# Patient Record
Sex: MALE | Race: OTHER | HISPANIC OR LATINO | URBAN - METROPOLITAN AREA
[De-identification: names, ages, dates, MRNs, and addresses within clinical notes are randomized per-mention and may not be internally consistent; named-entity substitution may affect disease eponyms.]

---

## 2018-11-10 ENCOUNTER — EMERGENCY (EMERGENCY)
Facility: HOSPITAL | Age: 48
LOS: 1 days | Discharge: HOME | End: 2018-11-10
Attending: EMERGENCY MEDICINE
Payer: MEDICAID

## 2018-11-10 VITALS
SYSTOLIC BLOOD PRESSURE: 136 MMHG | RESPIRATION RATE: 17 BRPM | TEMPERATURE: 98 F | OXYGEN SATURATION: 96 % | WEIGHT: 160.06 LBS | HEIGHT: 68 IN | HEART RATE: 99 BPM | DIASTOLIC BLOOD PRESSURE: 82 MMHG

## 2018-11-10 PROCEDURE — 74176 CT ABD & PELVIS W/O CONTRAST: CPT | Mod: 26

## 2018-11-10 PROCEDURE — 99284 EMERGENCY DEPT VISIT MOD MDM: CPT | Mod: 25

## 2018-11-10 RX ORDER — ACETAMINOPHEN 500 MG
975 TABLET ORAL ONCE
Qty: 0 | Refills: 0 | Status: COMPLETED | OUTPATIENT
Start: 2018-11-10 | End: 2018-11-10

## 2018-11-10 RX ADMIN — Medication 975 MILLIGRAM(S): at 23:39

## 2018-11-10 RX ADMIN — Medication 100 MILLIGRAM(S): at 23:39

## 2018-11-10 NOTE — ED ADULT NURSE NOTE - OBJECTIVE STATEMENT
Pt is a 48YOM no sig medical history received ambulatory A&Ox4 complaining of abscess. Pt states that he had an abscess to his R lower abdomen starting on Monday. Daughter states that he "popped" this on Thursday and also noted another abscess to his R gluteal fold. Pt states that his daughter "popped" that abscess with white puss coming out. Pt notes pain and increased redness to both sites, worse than before. Pt denies any fever or chills. Pts pain radiating to scrotal sac. No swelling noted to testicles.

## 2018-11-10 NOTE — ED PROVIDER NOTE - OBJECTIVE STATEMENT
47 yo male with pmhx of psoriasis presents with abscess in the right medial gluteal fold. Pt states he noticed a pimple like structure 9 days ago that became larger with a fluctuant center surrounded by erythema. 5 days ago, pt had his daughter pop it with a disinfected needle and expressed pus. In the ED, an 5x5cm area of induration, central healing scab, and no area of fluctuance is noted. Pt also has similar area of induration and healing scab on his RLQ. Pt denies fever, chills, testicular and penile pain, urinary symptoms, discharge, pain or difficulty moving his bowels. Mild erythema on the right scrotal skin is noted, but patient states it appears baseline.

## 2018-11-10 NOTE — ED PROVIDER NOTE - MEDICAL DECISION MAKING DETAILS
47 yo male with hx of psoriasis presents with gluteal fold abscess. DDx: skin abscess, perirectal/ perianal abscess (not likely given location), vasile's gangrene (not likely given no pain on exam). IV abx in CDU to ensure not vasile's. D/C home on oral abx if no progression to Vasile's Ghulam: 48 year old male with right buttock abscess (not near anus) that was drained at home now with surrounding erythema and swelling upto medial thigh. no tenderness to scrotum but mild erythema , no crepitus, no testicle involvement. no fevers. will get labs, ct, iv abx given extent of cellulitis, likely cdu for further management.

## 2018-11-10 NOTE — ED PROVIDER NOTE - NS ED ROS FT
GENERAL: No fever, chills  HEENT: no trouble swallowing or speaking   CARDIAC: no chest pain/pressure, SOB, lower ex edema  PULMONARY: no cough, SOB  GI: no abdominal pain, n/v/d/c.   : no dysuria, frequency, hematuria  SKIN: + area of induration with central healing scab, no vesicles  NEURO: no headache, paraesthesias.   MSK: No joint pain, myalgia, weakness.

## 2018-11-11 VITALS
TEMPERATURE: 98 F | HEART RATE: 78 BPM | DIASTOLIC BLOOD PRESSURE: 61 MMHG | SYSTOLIC BLOOD PRESSURE: 110 MMHG | OXYGEN SATURATION: 98 % | RESPIRATION RATE: 18 BRPM

## 2018-11-11 LAB
ALBUMIN SERPL ELPH-MCNC: 4.2 G/DL — SIGNIFICANT CHANGE UP (ref 3.3–5)
ALP SERPL-CCNC: 95 U/L — SIGNIFICANT CHANGE UP (ref 40–120)
ALT FLD-CCNC: 38 U/L — SIGNIFICANT CHANGE UP (ref 10–45)
ANION GAP SERPL CALC-SCNC: 13 MMOL/L — SIGNIFICANT CHANGE UP (ref 5–17)
APPEARANCE UR: CLEAR — SIGNIFICANT CHANGE UP
AST SERPL-CCNC: 34 U/L — SIGNIFICANT CHANGE UP (ref 10–40)
BACTERIA # UR AUTO: NEGATIVE — SIGNIFICANT CHANGE UP
BASE EXCESS BLDV CALC-SCNC: -0.1 MMOL/L — SIGNIFICANT CHANGE UP (ref -2–2)
BASOPHILS # BLD AUTO: 0 K/UL — SIGNIFICANT CHANGE UP (ref 0–0.2)
BASOPHILS NFR BLD AUTO: 0.4 % — SIGNIFICANT CHANGE UP (ref 0–2)
BILIRUB SERPL-MCNC: 0.3 MG/DL — SIGNIFICANT CHANGE UP (ref 0.2–1.2)
BILIRUB UR-MCNC: NEGATIVE — SIGNIFICANT CHANGE UP
BUN SERPL-MCNC: 22 MG/DL — SIGNIFICANT CHANGE UP (ref 7–23)
CA-I SERPL-SCNC: 1.16 MMOL/L — SIGNIFICANT CHANGE UP (ref 1.12–1.3)
CALCIUM SERPL-MCNC: 9.1 MG/DL — SIGNIFICANT CHANGE UP (ref 8.4–10.5)
CHLORIDE BLDV-SCNC: 107 MMOL/L — SIGNIFICANT CHANGE UP (ref 96–108)
CHLORIDE SERPL-SCNC: 105 MMOL/L — SIGNIFICANT CHANGE UP (ref 96–108)
CO2 BLDV-SCNC: 26 MMOL/L — SIGNIFICANT CHANGE UP (ref 22–30)
CO2 SERPL-SCNC: 23 MMOL/L — SIGNIFICANT CHANGE UP (ref 22–31)
COLOR SPEC: YELLOW — SIGNIFICANT CHANGE UP
CREAT SERPL-MCNC: 1.06 MG/DL — SIGNIFICANT CHANGE UP (ref 0.5–1.3)
DIFF PNL FLD: NEGATIVE — SIGNIFICANT CHANGE UP
EOSINOPHIL # BLD AUTO: 0.1 K/UL — SIGNIFICANT CHANGE UP (ref 0–0.5)
EOSINOPHIL NFR BLD AUTO: 0.9 % — SIGNIFICANT CHANGE UP (ref 0–6)
EPI CELLS # UR: 1 /HPF — SIGNIFICANT CHANGE UP
GAS PNL BLDV: 140 MMOL/L — SIGNIFICANT CHANGE UP (ref 136–145)
GAS PNL BLDV: SIGNIFICANT CHANGE UP
GAS PNL BLDV: SIGNIFICANT CHANGE UP
GLUCOSE BLDV-MCNC: 102 MG/DL — HIGH (ref 70–99)
GLUCOSE SERPL-MCNC: 108 MG/DL — HIGH (ref 70–99)
GLUCOSE UR QL: NEGATIVE — SIGNIFICANT CHANGE UP
HCO3 BLDV-SCNC: 24 MMOL/L — SIGNIFICANT CHANGE UP (ref 21–29)
HCT VFR BLD CALC: 42.8 % — SIGNIFICANT CHANGE UP (ref 39–50)
HCT VFR BLDA CALC: 43 % — SIGNIFICANT CHANGE UP (ref 39–50)
HGB BLD CALC-MCNC: 14.2 G/DL — SIGNIFICANT CHANGE UP (ref 13–17)
HGB BLD-MCNC: 14.5 G/DL — SIGNIFICANT CHANGE UP (ref 13–17)
HYALINE CASTS # UR AUTO: 1 /LPF — SIGNIFICANT CHANGE UP (ref 0–2)
KETONES UR-MCNC: NEGATIVE — SIGNIFICANT CHANGE UP
LACTATE BLDV-MCNC: 1.6 MMOL/L — SIGNIFICANT CHANGE UP (ref 0.7–2)
LEUKOCYTE ESTERASE UR-ACNC: NEGATIVE — SIGNIFICANT CHANGE UP
LYMPHOCYTES # BLD AUTO: 2.5 K/UL — SIGNIFICANT CHANGE UP (ref 1–3.3)
LYMPHOCYTES # BLD AUTO: 23.4 % — SIGNIFICANT CHANGE UP (ref 13–44)
MCHC RBC-ENTMCNC: 28.4 PG — SIGNIFICANT CHANGE UP (ref 27–34)
MCHC RBC-ENTMCNC: 33.8 GM/DL — SIGNIFICANT CHANGE UP (ref 32–36)
MCV RBC AUTO: 84.1 FL — SIGNIFICANT CHANGE UP (ref 80–100)
MONOCYTES # BLD AUTO: 1 K/UL — HIGH (ref 0–0.9)
MONOCYTES NFR BLD AUTO: 9.2 % — SIGNIFICANT CHANGE UP (ref 2–14)
NEUTROPHILS # BLD AUTO: 7.1 K/UL — SIGNIFICANT CHANGE UP (ref 1.8–7.4)
NEUTROPHILS NFR BLD AUTO: 66.1 % — SIGNIFICANT CHANGE UP (ref 43–77)
NITRITE UR-MCNC: NEGATIVE — SIGNIFICANT CHANGE UP
PCO2 BLDV: 42 MMHG — SIGNIFICANT CHANGE UP (ref 35–50)
PH BLDV: 7.38 — SIGNIFICANT CHANGE UP (ref 7.35–7.45)
PH UR: 6 — SIGNIFICANT CHANGE UP (ref 5–8)
PLATELET # BLD AUTO: 205 K/UL — SIGNIFICANT CHANGE UP (ref 150–400)
PO2 BLDV: <50 MMHG — SIGNIFICANT CHANGE UP (ref 25–45)
POTASSIUM BLDV-SCNC: 3.5 MMOL/L — SIGNIFICANT CHANGE UP (ref 3.5–5.3)
POTASSIUM SERPL-MCNC: 3.7 MMOL/L — SIGNIFICANT CHANGE UP (ref 3.5–5.3)
POTASSIUM SERPL-SCNC: 3.7 MMOL/L — SIGNIFICANT CHANGE UP (ref 3.5–5.3)
PROT SERPL-MCNC: 7.2 G/DL — SIGNIFICANT CHANGE UP (ref 6–8.3)
PROT UR-MCNC: ABNORMAL
RBC # BLD: 5.09 M/UL — SIGNIFICANT CHANGE UP (ref 4.2–5.8)
RBC # FLD: 12.9 % — SIGNIFICANT CHANGE UP (ref 10.3–14.5)
RBC CASTS # UR COMP ASSIST: 1 /HPF — SIGNIFICANT CHANGE UP (ref 0–4)
SAO2 % BLDV: 60 % — LOW (ref 67–88)
SODIUM SERPL-SCNC: 141 MMOL/L — SIGNIFICANT CHANGE UP (ref 135–145)
SP GR SPEC: 1.03 — HIGH (ref 1.01–1.02)
UROBILINOGEN FLD QL: NEGATIVE — SIGNIFICANT CHANGE UP
WBC # BLD: 10.8 K/UL — HIGH (ref 3.8–10.5)
WBC # FLD AUTO: 10.8 K/UL — HIGH (ref 3.8–10.5)
WBC UR QL: 2 /HPF — SIGNIFICANT CHANGE UP (ref 0–5)

## 2018-11-11 PROCEDURE — 96376 TX/PRO/DX INJ SAME DRUG ADON: CPT

## 2018-11-11 PROCEDURE — 84132 ASSAY OF SERUM POTASSIUM: CPT

## 2018-11-11 PROCEDURE — 83605 ASSAY OF LACTIC ACID: CPT

## 2018-11-11 PROCEDURE — 85014 HEMATOCRIT: CPT

## 2018-11-11 PROCEDURE — 82330 ASSAY OF CALCIUM: CPT

## 2018-11-11 PROCEDURE — 99284 EMERGENCY DEPT VISIT MOD MDM: CPT | Mod: 25

## 2018-11-11 PROCEDURE — 80053 COMPREHEN METABOLIC PANEL: CPT

## 2018-11-11 PROCEDURE — 74176 CT ABD & PELVIS W/O CONTRAST: CPT

## 2018-11-11 PROCEDURE — 82803 BLOOD GASES ANY COMBINATION: CPT

## 2018-11-11 PROCEDURE — 96374 THER/PROPH/DIAG INJ IV PUSH: CPT

## 2018-11-11 PROCEDURE — 85027 COMPLETE CBC AUTOMATED: CPT

## 2018-11-11 PROCEDURE — 99234 HOSP IP/OBS SM DT SF/LOW 45: CPT

## 2018-11-11 PROCEDURE — 84295 ASSAY OF SERUM SODIUM: CPT

## 2018-11-11 PROCEDURE — 96361 HYDRATE IV INFUSION ADD-ON: CPT

## 2018-11-11 PROCEDURE — 82947 ASSAY GLUCOSE BLOOD QUANT: CPT

## 2018-11-11 PROCEDURE — G0378: CPT

## 2018-11-11 PROCEDURE — 81001 URINALYSIS AUTO W/SCOPE: CPT

## 2018-11-11 PROCEDURE — 82435 ASSAY OF BLOOD CHLORIDE: CPT

## 2018-11-11 RX ORDER — IBUPROFEN 200 MG
600 TABLET ORAL ONCE
Qty: 0 | Refills: 0 | Status: COMPLETED | OUTPATIENT
Start: 2018-11-11 | End: 2018-11-11

## 2018-11-11 RX ORDER — SODIUM CHLORIDE 9 MG/ML
1000 INJECTION INTRAMUSCULAR; INTRAVENOUS; SUBCUTANEOUS ONCE
Qty: 0 | Refills: 0 | Status: COMPLETED | OUTPATIENT
Start: 2018-11-11 | End: 2018-11-11

## 2018-11-11 RX ADMIN — Medication 600 MILLIGRAM(S): at 03:00

## 2018-11-11 RX ADMIN — Medication 100 MILLIGRAM(S): at 16:00

## 2018-11-11 RX ADMIN — SODIUM CHLORIDE 1000 MILLILITER(S): 9 INJECTION INTRAMUSCULAR; INTRAVENOUS; SUBCUTANEOUS at 02:53

## 2018-11-11 RX ADMIN — Medication 600 MILLIGRAM(S): at 02:54

## 2018-11-11 RX ADMIN — SODIUM CHLORIDE 1000 MILLILITER(S): 9 INJECTION INTRAMUSCULAR; INTRAVENOUS; SUBCUTANEOUS at 03:53

## 2018-11-11 RX ADMIN — Medication 100 MILLIGRAM(S): at 08:14

## 2018-11-11 NOTE — ED CDU PROVIDER DISPOSITION NOTE - PLAN OF CARE
1. Follow up with your PMD within 48-72hours (2-3 days).   2. Take Clindamycin 450mg (one 300mg pill and one 150mg pill together) every 8 hours for 10 days. Finish the full course of your antibiotic, do not skip doses. Take a probiotic while taking your antibiotic.  3. Rest and elevate affected area. Take Motrin 600mg every 8 hours with food for pain.   4. Return to ED if you experience any worsening redness, swelling, streaking (red lines), fever, chills, rectal pain, testicular or penile pain, difficulty passing urine or stool, or any other concerning symptoms.

## 2018-11-11 NOTE — ED CDU PROVIDER DISPOSITION NOTE - ATTENDING CONTRIBUTION TO CARE
ED attending Dr Yves Wilson note:  Patient re-evaluated and doing well.  No acute issues at  this time.  Lab and radiology tests reviewed with patient and/or family.  Patient stable for discharge.  I have personally performed a face to face diagnostic evaluation on this patient.  I have reviewed the ACP note and agree with the history, exam, and plan of care, except as noted.  History and Exam by me showed improvement in cellulitis with vss, to give one more dose of iv clinda at 4pm before d.c home and obs period, appreciate gu consult.

## 2018-11-11 NOTE — CONSULT NOTE ADULT - ASSESSMENT
48y Male no know PMHx, come in c/o R gluteal fold abscess. No Fever, Chill.  Denies any urinary symptoms. Negative Genitourinary physical exam. CT finding most likely air trapped within foreskin    Plan   No urology intervention needed   rest of care per primary team

## 2018-11-11 NOTE — ED ADULT NURSE REASSESSMENT NOTE - NS ED NURSE REASSESS COMMENT FT1
pt off to CT scan
Received pt from CHARLES Mear , received pt alert and responsive, oriented x4, denies any respiratory distress, SOB, or difficulty breathing. Pt transferred to CDU for abscess to R gluteal fold and scrotal swelling, pt due for IV clindamycin q8. C/o 9/10 pain to site will medicate per order. IV in place, patent and free of signs of infiltration, V/S stable, pt afebrile, Pt educated on unit and unit rules, instructed patient to notify RN of any needed assistance, Pt verbalizes understanding, Call bell placed within reach. Safety maintained. Will continue to monitor. Daughter at bedside.

## 2018-11-11 NOTE — ED CDU PROVIDER INITIAL DAY NOTE - OBJECTIVE STATEMENT
47 y/o M with h/o psoriasis managed with topical steroids presents with daughter at bedside c/o abscess to R inner thigh and lower abdomen. Pt is English and [place] speaking prefers his daughter translate for him if needed, declines  service. Pt states that he first noticed the abscess in on Friday 11/9, his daughter tried to pop it and expressed some purulent material but since the pain to the area has worsened with surrounding redness. At ED presentation pain was 10/10. Pt noted occasional radiation of pain to R testicle, pain is sharp, nothing makes it better or worse, no pain meds tried, no abx used. Pt denies prior episodes. Pt denies f/c, HA, CP, SOB, abd pain, n/v/d, difficulty urinating, dysuria, hematuria, rectal pain, rectal bleeding, penile rash/DC, h/o STD, DM or chronic PO steroid use, IVDA.  ED course: Pain controlled with APAP. CBC, CMP, VBG all unremarkable. CTAP w/o contrast: R posterior medial thigh skin thickening and underlying subcutaneous infiltration. No discrete drainable collection or subcutaneous gas within the imaged portion of the thigh. Nonspecific thickened appearance to the penile shaft width a few foci of gas. Findings may represent foci of air trapped within foreskin can't r/o SubQ gas. ED team had little concern that this was vasile gangrene based on exam findings. 47 y/o M with h/o psoriasis managed with topical steroids presents with daughter at bedside c/o abscess to R inner thigh and lower abdomen. Pt is English and Cong[place]ese speaking prefers his daughter translate for him if needed, declines  service. Pt states that he first noticed the abscess in on Friday 11/9, his daughter tried to pop it and expressed some purulent material but since the pain to the area has worsened with surrounding redness. At ED presentation pain was 10/10. Pt noted occasional radiation of pain to R testicle, pain is sharp, nothing makes it better or worse, no pain meds tried, no abx used. Pt denies prior episodes. Pt denies f/c, HA, CP, SOB, abd pain, n/v/d, difficulty urinating, dysuria, hematuria, rectal pain, rectal bleeding, penile rash/DC, h/o STD, DM or chronic PO steroid use, IVDA.  ED course: Pain controlled with APAP. CBC, CMP, VBG all unremarkable. CTAP w/o contrast: R posterior medial thigh skin thickening and underlying subcutaneous infiltration. No discrete drainable collection or subcutaneous gas within the imaged portion of the thigh. Nonspecific thickened appearance to the penile shaft width a few foci of gas. Findings may represent foci of air trapped within foreskin can't r/o SubQ gas. ED team had little concern that this was vasile gangrene based on exam findings.  PMD: Dr. Caballero

## 2018-11-11 NOTE — ED CDU PROVIDER DISPOSITION NOTE - CLINICAL COURSE
49 y/o M with h/o psoriasis managed with topical steroids presents with daughter at bedside c/o abscess to R inner thigh and lower abdomen. Pt is English and [place] speaking prefers his daughter translate for him if needed, declines  service. Pt states that he first noticed the abscess in on Friday 11/9, his daughter tried to pop it and expressed some purulent material but since the pain to the area has worsened with surrounding redness. At ED presentation pain was 10/10. Pt noted occasional radiation of pain to R testicle, pain is sharp, nothing makes it better or worse, no pain meds tried, no abx used. Pt denies prior episodes. Pt denies f/c, HA, CP, SOB, abd pain, n/v/d, difficulty urinating, dysuria, hematuria, rectal pain, rectal bleeding, penile rash/DC, h/o STD, DM or chronic PO steroid use, IVDA.  ED course: Pain controlled with APAP. CBC, CMP, VBG all unremarkable. CTAP w/o contrast: R posterior medial thigh skin thickening and underlying subcutaneous infiltration. No discrete drainable collection or subcutaneous gas within the imaged portion of the thigh. Nonspecific thickened appearance to the penile shaft width a few foci of gas. Findings may represent foci of air trapped within foreskin can't r/o SubQ gas. ED team had little concern that this was vasile gangrene based on exam findings.  CDU: 49 y/o M with h/o psoriasis managed with topical steroids presents with daughter at bedside c/o abscess to R inner thigh and lower abdomen. Pt is English and [place] speaking prefers his daughter translate for him if needed, declines  service. Pt states that he first noticed the abscess in on Friday 11/9, his daughter tried to pop it and expressed some purulent material but since the pain to the area has worsened with surrounding redness. At ED presentation pain was 10/10. Pt noted occasional radiation of pain to R testicle, pain is sharp, nothing makes it better or worse, no pain meds tried, no abx used. Pt denies prior episodes. Pt denies f/c, HA, CP, SOB, abd pain, n/v/d, difficulty urinating, dysuria, hematuria, rectal pain, rectal bleeding, penile rash/DC, h/o STD, DM or chronic PO steroid use, IVDA.  ED course: Pain controlled with APAP. CBC, CMP, VBG all unremarkable. CTAP w/o contrast: R posterior medial thigh skin thickening and underlying subcutaneous infiltration. No discrete drainable collection or subcutaneous gas within the imaged portion of the thigh. Nonspecific thickened appearance to the penile shaft width a few foci of gas. Findings may represent foci of air trapped within foreskin can't r/o SubQ gas. ED team had little concern that this was vasile gangrene based on exam findings.  CDU: IV abx, VSS, improving redness. close follow up and strict return precautions.

## 2018-11-11 NOTE — ED CDU PROVIDER INITIAL DAY NOTE - PROGRESS NOTE DETAILS
Pt was evaluated at bedside by urology, urology resident states that she does not believe the pt needs further intervention and that the genitals clinically do not appear infected. Pt in NAD, sleeping comfortably. VS stable from last reading. Will continue to monitor. Pt resting comfortably. NAD. No complaints. VSS. pending 3rd abx IVSS and will discharge with close follow up with PMD

## 2018-11-11 NOTE — CONSULT NOTE ADULT - SUBJECTIVE AND OBJECTIVE BOX
HPI:  Patient  48y Male no know PMHx, come in c/o R gluteal fold abscess. He had this abscess this week, and his daughter "Popped" it at home 2 days ago, states puss coming out. in the past 2 days, the pain getting worse around the abscess area. Denies any pain on the scrotal or testicles. Denies fever, chill, dysuria.     PAST MEDICAL & SURGICAL HISTORY:  No pertinent past medical history  No significant past surgical history    FAMILY HISTORY:  No pertinent family history in first degree relatives    SOCIAL HISTORY:   Tobacco hx:  MEDICATIONS  (STANDING):  clindamycin IVPB 600 milliGRAM(s) IV Intermittent every 8 hours    MEDICATIONS  (PRN):    Allergies    No Known Allergies    Intolerances        REVIEW OF SYSTEMS: Pertinent positives and negatives as stated in HPI, otherwise negative    Vital signs  T(C): 36.7 (11-11-18 @ 02:46), Max: 36.8 (11-10-18 @ 21:34)  HR: 67 (11-11-18 @ 02:46)  BP: 93/51 (11-11-18 @ 02:46)  SpO2: 95% (11-11-18 @ 02:46)  Wt(kg): --    Output      Physical Exam  Gen: NAD  Pulm: No respiratory distress, no subcostal retractions  CV: RRR, no JVD  Abd: Soft, NT, ND  : Uncircumcised,  no lesions.  No discharge or blood at urethral meatus.  Testes descended bilaterally.  Testes and epididymis nontender bilaterally.    Extremities: R gluteal fold see erythema about 5x5cm,  a small abscess opening at the center, not draining. About 3x3 cm hard mass under the skin, and pain for palpation.  MSK: No edema present    LABS:        11-11 @ 00:16    WBC --    / Hct --    / SCr 1.06     11-11 @ 00:06    WBC 10.8  / Hct 42.8  / SCr --       11-11    141  |  105  |  22  ----------------------------<  108<H>  3.7   |  23  |  1.06    Ca    9.1      11 Nov 2018 00:16    TPro  7.2  /  Alb  4.2  /  TBili  0.3  /  DBili  x   /  AST  34  /  ALT  38  /  AlkPhos  95  11-11          Urine Cx:   Blood Cx:    Radiology:  Partially imaged right posterior medial thigh skin thickening and   underlying subcutaneous infiltration. No discrete drainable collection or   subcutaneous gas within the imaged portion of the thigh.    Nonspecific thickened appearance to the penile shaft width a few foci of   gas. Findings may represent foci of air trapped within foreskin, however   subcutaneous gas cannot be entirely excluded. Correlate with clinical   exam and symptomatology.

## 2018-11-11 NOTE — ED CDU PROVIDER INITIAL DAY NOTE - PHYSICAL EXAMINATION
GEN: Pt in NAD, A&O x3.   EYES: Sclera white w/o injection, PERRLA.   RESP: No chest wall tenderness, CTA b/l, no wheezes, rales, or rhonchi.   CARDIAC: RRR, clear distinct S1, S2, no S3, S4, murmurs, gallops, or rubs.   ABD: Abdomen non-distended, soft, non-tender. No CVAT b/l.  GENITAL: External genitalia unremarkable no vesicles, ulcers, chancres, or other lesions. Foreskin with redundant tissue, easily retracted no phimosis or paraphimosis, no penile pain or DC. No scrotal swelling or high riding testicles. +cremasteric reflex b/l. No testicular pain. No extension of erythema noted on the genitals. No abscesses.  SKIN: ~3cm induration with excoriated surface on R proximal medial thigh with surrounding erythema that extends to the buttocks but not into the gluteal fold, anus, rectum, or genitals. 1.5cm area of induration midline lower abdomen inferior to the umbilicus with surrounding erythema. No active bleeding or DC, no visible FB, no fluctuance, no crepitus, no streaking. GEN: Pt in NAD, A&O x3.   EYES: Sclera white w/o injection, PERRLA.   RESP: No chest wall tenderness, CTA b/l, no wheezes, rales, or rhonchi.   CARDIAC: RRR, clear distinct S1, S2, no S3, S4, murmurs, gallops, or rubs.   ABD: Abdomen non-distended, soft, non-tender. No CVAT b/l.  GENITAL: External genitalia unremarkable no vesicles, ulcers, chancres, or other lesions. Foreskin with redundant tissue, easily retracted no phimosis or paraphimosis, no penile pain or DC. No scrotal swelling or high riding testicles. +cremasteric reflex b/l. No testicular pain. No extension of erythema noted on the genitals. No abscesses.  SKIN: ~3cm induration with excoriated surface on R proximal medial thigh with surrounding erythema that extends to the buttocks but not into the gluteal fold, anus, rectum, or genitals. 1.5cm area of induration midline lower abdomen inferior to the umbilicus with surrounding erythema. No active bleeding or DC, no visible FB, no fluctuance, no crepitus, no streaking. Erythema of midline pilonidal area, mild TTP, with small area of induration palpated, no fluctuance, no active DC or FB. Erythema is separate from that of the thigh and umbilical abscesses.

## 2023-01-20 ENCOUNTER — EMERGENCY (EMERGENCY)
Facility: HOSPITAL | Age: 53
LOS: 1 days | Discharge: HOME | End: 2023-01-20
Attending: EMERGENCY MEDICINE
Payer: COMMERCIAL

## 2023-01-20 VITALS
OXYGEN SATURATION: 97 % | RESPIRATION RATE: 18 BRPM | HEIGHT: 66 IN | WEIGHT: 149.91 LBS | SYSTOLIC BLOOD PRESSURE: 132 MMHG | HEART RATE: 74 BPM | DIASTOLIC BLOOD PRESSURE: 80 MMHG

## 2023-01-20 PROCEDURE — 99284 EMERGENCY DEPT VISIT MOD MDM: CPT

## 2023-01-20 PROCEDURE — 99283 EMERGENCY DEPT VISIT LOW MDM: CPT | Mod: 25

## 2023-01-20 PROCEDURE — 71046 X-RAY EXAM CHEST 2 VIEWS: CPT | Mod: 26

## 2023-01-20 PROCEDURE — 71046 X-RAY EXAM CHEST 2 VIEWS: CPT

## 2023-01-20 RX ORDER — IBUPROFEN 200 MG
600 TABLET ORAL ONCE
Refills: 0 | Status: COMPLETED | OUTPATIENT
Start: 2023-01-20 | End: 2023-01-20

## 2023-01-20 RX ORDER — LIDOCAINE 4 G/100G
1 CREAM TOPICAL ONCE
Refills: 0 | Status: COMPLETED | OUTPATIENT
Start: 2023-01-20 | End: 2023-01-20

## 2023-01-20 RX ORDER — ACETAMINOPHEN 500 MG
975 TABLET ORAL ONCE
Refills: 0 | Status: COMPLETED | OUTPATIENT
Start: 2023-01-20 | End: 2023-01-20

## 2023-01-20 RX ADMIN — Medication 600 MILLIGRAM(S): at 21:00

## 2023-01-20 RX ADMIN — Medication 975 MILLIGRAM(S): at 21:00

## 2023-01-20 RX ADMIN — LIDOCAINE 1 PATCH: 4 CREAM TOPICAL at 21:00
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
## 2023-01-20 NOTE — ED PROVIDER NOTE - PATIENT PORTAL LINK FT
You can access the FollowMyHealth Patient Portal offered by [facility] by registering at the following website: [email or web address]. By joining [facility]’s FollowMyHealth portal, you will also be able to view your health information using other applications (apps) compatible with our system.

## 2023-01-20 NOTE — ED PROVIDER NOTE - PHYSICAL EXAMINATION
Gen:  NAD. In C-collar  HEENT: EOMI, no nasal discharge, mucous membranes moist  CV: RRR, +S1/S2, no M/R/G, 2+ radial pulses b/l  Resp: CTAB, no W/R/R, no accessory muscle use, no increased work of breathing  GI: Abdomen soft non-distended, NTTP  MSK: No midline spinal tenderness. +Left c-spine paraspinal tenderness to palpation and TTP posterior shoulder over Lt superior scapula. Full ROM at shoulders b/l. Neck ROM unable to assess due to collar.   Neuro: CN II-XII intact. Finger to nose intact. A&Ox4, following commands, moving all four extremities spontaneously. Strength 5/5 b/l UE and LE.   Psych: appropriate mood

## 2023-01-20 NOTE — ED PROVIDER NOTE - TEST CONSIDERED BUT NOT PERFORMED
Tests Considered But Not Performed CT of the head and cervical spine were considered however cervical spine cleared clinically of fracture without need for imaging according to Nexus Criteria, this patient is cleared clinically and does not require a head CT by [place] CT Injury Rule guidance.

## 2023-01-20 NOTE — ED PROVIDER NOTE - NSFOLLOWUPINSTRUCTIONS_ED_ALL_ED_FT
No heavy lifting until neck pain resolves.     We evaluated you in the emergency room and it appears that you have a neck strain.     We recommend that you rest, ice and take tylenol(650 mg up to three times a day)/motrin(600 mg up to three times a day) for your symptoms.     After 48 hours please use heat on the area that is hurting.     If you still have persistent pain after 5-7 days after the injury please be re-evaluated as there could be a more severe diagnosis than just a strain. If you develop numbness, weakness, change in color of your extremities (turn blue or white), worsening pain please seek immediate medical care for repeat evaluation.

## 2023-01-21 PROBLEM — L40.9 PSORIASIS, UNSPECIFIED: Chronic | Status: ACTIVE | Noted: 2018-11-11

## 2023-02-09 ENCOUNTER — INPATIENT (INPATIENT)
Facility: HOSPITAL | Age: 53
LOS: 4 days | Discharge: HOME | DRG: 438 | End: 2023-02-14
Attending: INTERNAL MEDICINE | Admitting: STUDENT IN AN ORGANIZED HEALTH CARE EDUCATION/TRAINING PROGRAM
Payer: MEDICAID

## 2023-02-09 VITALS
SYSTOLIC BLOOD PRESSURE: 109 MMHG | HEART RATE: 71 BPM | DIASTOLIC BLOOD PRESSURE: 67 MMHG | OXYGEN SATURATION: 95 % | HEIGHT: 66 IN | TEMPERATURE: 98 F | WEIGHT: 149.91 LBS | RESPIRATION RATE: 18 BRPM

## 2023-02-09 LAB
ALBUMIN SERPL ELPH-MCNC: 4 G/DL — SIGNIFICANT CHANGE UP (ref 3.3–5)
ALP SERPL-CCNC: 90 U/L — SIGNIFICANT CHANGE UP (ref 40–120)
ALT FLD-CCNC: 22 U/L — SIGNIFICANT CHANGE UP (ref 10–45)
ANION GAP SERPL CALC-SCNC: 12 MMOL/L — SIGNIFICANT CHANGE UP (ref 5–17)
APTT BLD: 27.4 SEC — LOW (ref 27.5–35.5)
AST SERPL-CCNC: 34 U/L — SIGNIFICANT CHANGE UP (ref 10–40)
BASOPHILS # BLD AUTO: 0.04 K/UL — SIGNIFICANT CHANGE UP (ref 0–0.2)
BASOPHILS NFR BLD AUTO: 0.4 % — SIGNIFICANT CHANGE UP (ref 0–2)
BILIRUB SERPL-MCNC: 0.5 MG/DL — SIGNIFICANT CHANGE UP (ref 0.2–1.2)
BLD GP AB SCN SERPL QL: NEGATIVE — SIGNIFICANT CHANGE UP
BUN SERPL-MCNC: 22 MG/DL — SIGNIFICANT CHANGE UP (ref 7–23)
CALCIUM SERPL-MCNC: 9.1 MG/DL — SIGNIFICANT CHANGE UP (ref 8.4–10.5)
CHLORIDE SERPL-SCNC: 105 MMOL/L — SIGNIFICANT CHANGE UP (ref 96–108)
CO2 SERPL-SCNC: 23 MMOL/L — SIGNIFICANT CHANGE UP (ref 22–31)
CREAT SERPL-MCNC: 0.99 MG/DL — SIGNIFICANT CHANGE UP (ref 0.5–1.3)
EGFR: 91 ML/MIN/1.73M2 — SIGNIFICANT CHANGE UP
EOSINOPHIL # BLD AUTO: 0.07 K/UL — SIGNIFICANT CHANGE UP (ref 0–0.5)
EOSINOPHIL NFR BLD AUTO: 0.8 % — SIGNIFICANT CHANGE UP (ref 0–6)
FLUAV AG NPH QL: SIGNIFICANT CHANGE UP
FLUBV AG NPH QL: SIGNIFICANT CHANGE UP
GLUCOSE SERPL-MCNC: 91 MG/DL — SIGNIFICANT CHANGE UP (ref 70–99)
HCT VFR BLD CALC: 43.3 % — SIGNIFICANT CHANGE UP (ref 39–50)
HGB BLD-MCNC: 13.9 G/DL — SIGNIFICANT CHANGE UP (ref 13–17)
IMM GRANULOCYTES NFR BLD AUTO: 0.4 % — SIGNIFICANT CHANGE UP (ref 0–0.9)
INR BLD: 1.31 RATIO — HIGH (ref 0.88–1.16)
LIDOCAIN IGE QN: 415 U/L — HIGH (ref 7–60)
LYMPHOCYTES # BLD AUTO: 1.33 K/UL — SIGNIFICANT CHANGE UP (ref 1–3.3)
LYMPHOCYTES # BLD AUTO: 14.7 % — SIGNIFICANT CHANGE UP (ref 13–44)
MCHC RBC-ENTMCNC: 27.6 PG — SIGNIFICANT CHANGE UP (ref 27–34)
MCHC RBC-ENTMCNC: 32.1 GM/DL — SIGNIFICANT CHANGE UP (ref 32–36)
MCV RBC AUTO: 85.9 FL — SIGNIFICANT CHANGE UP (ref 80–100)
MONOCYTES # BLD AUTO: 0.72 K/UL — SIGNIFICANT CHANGE UP (ref 0–0.9)
MONOCYTES NFR BLD AUTO: 8 % — SIGNIFICANT CHANGE UP (ref 2–14)
NEUTROPHILS # BLD AUTO: 6.82 K/UL — SIGNIFICANT CHANGE UP (ref 1.8–7.4)
NEUTROPHILS NFR BLD AUTO: 75.7 % — SIGNIFICANT CHANGE UP (ref 43–77)
NRBC # BLD: 0 /100 WBCS — SIGNIFICANT CHANGE UP (ref 0–0)
PLATELET # BLD AUTO: 246 K/UL — SIGNIFICANT CHANGE UP (ref 150–400)
POTASSIUM SERPL-MCNC: 4.8 MMOL/L — SIGNIFICANT CHANGE UP (ref 3.5–5.3)
POTASSIUM SERPL-SCNC: 4.8 MMOL/L — SIGNIFICANT CHANGE UP (ref 3.5–5.3)
PROT SERPL-MCNC: 7.8 G/DL — SIGNIFICANT CHANGE UP (ref 6–8.3)
PROTHROM AB SERPL-ACNC: 15.2 SEC — HIGH (ref 10.5–13.4)
RBC # BLD: 5.04 M/UL — SIGNIFICANT CHANGE UP (ref 4.2–5.8)
RBC # FLD: 13.3 % — SIGNIFICANT CHANGE UP (ref 10.3–14.5)
RH IG SCN BLD-IMP: POSITIVE — SIGNIFICANT CHANGE UP
RSV RNA NPH QL NAA+NON-PROBE: SIGNIFICANT CHANGE UP
SARS-COV-2 RNA SPEC QL NAA+PROBE: DETECTED
SODIUM SERPL-SCNC: 140 MMOL/L — SIGNIFICANT CHANGE UP (ref 135–145)
WBC # BLD: 9.02 K/UL — SIGNIFICANT CHANGE UP (ref 3.8–10.5)
WBC # FLD AUTO: 9.02 K/UL — SIGNIFICANT CHANGE UP (ref 3.8–10.5)

## 2023-02-09 PROCEDURE — 74177 CT ABD & PELVIS W/CONTRAST: CPT | Mod: 26,MD

## 2023-02-09 PROCEDURE — 99285 EMERGENCY DEPT VISIT HI MDM: CPT

## 2023-02-09 PROCEDURE — 71046 X-RAY EXAM CHEST 2 VIEWS: CPT | Mod: 26

## 2023-02-09 RX ORDER — SODIUM CHLORIDE 9 MG/ML
1000 INJECTION INTRAMUSCULAR; INTRAVENOUS; SUBCUTANEOUS ONCE
Refills: 0 | Status: COMPLETED | OUTPATIENT
Start: 2023-02-09 | End: 2023-02-09

## 2023-02-09 RX ORDER — MORPHINE SULFATE 50 MG/1
4 CAPSULE, EXTENDED RELEASE ORAL ONCE
Refills: 0 | Status: DISCONTINUED | OUTPATIENT
Start: 2023-02-09 | End: 2023-02-09

## 2023-02-09 RX ORDER — ONDANSETRON 8 MG/1
4 TABLET, FILM COATED ORAL ONCE
Refills: 0 | Status: COMPLETED | OUTPATIENT
Start: 2023-02-09 | End: 2023-02-09

## 2023-02-09 RX ADMIN — SODIUM CHLORIDE 1000 MILLILITER(S): 9 INJECTION INTRAMUSCULAR; INTRAVENOUS; SUBCUTANEOUS at 21:55

## 2023-02-09 RX ADMIN — MORPHINE SULFATE 4 MILLIGRAM(S): 50 CAPSULE, EXTENDED RELEASE ORAL at 18:03

## 2023-02-09 RX ADMIN — SODIUM CHLORIDE 1000 MILLILITER(S): 9 INJECTION INTRAMUSCULAR; INTRAVENOUS; SUBCUTANEOUS at 18:02

## 2023-02-09 RX ADMIN — ONDANSETRON 4 MILLIGRAM(S): 8 TABLET, FILM COATED ORAL at 18:03

## 2023-02-09 NOTE — ED PROVIDER NOTE - PHYSICAL EXAMINATION
GENERAL: Awake. Alert. NAD. Well nourished.  HEENT: NC/AT, Conjunctiva pink, no scleral icterus. Airway patent. Moist mucous membranes.  LUNGS: CTAB. No wheezes or rales noted.  CARDIAC: Chest non-tender to palpation. RRR.  ABDOMEN: No masses noted. Soft, diffuse TTP with voluntary guarding  BACK: No midline spinal tenderness, no CVA tenderness  EXT: No edema, no calf tenderness, distal pulses 2+ bilaterally  NEURO: A&Ox3. Moving all extremities. Sensation and strength intact throughout.   SKIN: Warm and dry.   PSYCH: Normal affect.

## 2023-02-09 NOTE — ED PROVIDER NOTE - PROGRESS NOTE DETAILS
Penelope Kidd PGY2: I received sign out on this patient. I have reassessed patient and agree with the current plan. Will follow-up labs/imaging. CT results pending at this time. No free air on CXR. Lipase 400. TG ordered. No alcohol history.

## 2023-02-09 NOTE — ED ADULT NURSE NOTE - OBJECTIVE STATEMENT
Pt 52 year old male, A/O x4. Pt came in due to abdominal pain and constipation. No PMH. As per pt, x 2 days has had abdominal pain an SOB. Pt usually very active, today radha on walk had VUONG, and increased abdominal pain. Last bm today and was small and brown. Upon assessment, pt holding abdomen, speaking in full complete sentnces. Skin- warm, dry, intact. Abd. firm, tender, distended. Denies chest pain, n/v/d, ha, fever, chills, numbness/ tingling.

## 2023-02-09 NOTE — ED PROVIDER NOTE - CLINICAL SUMMARY MEDICAL DECISION MAKING FREE TEXT BOX
52M no PMH presenting to the ED with LLQ abdominal pain for the past 2 days associated with nausea, decreased appetite, and constipation (small BM today). Given hx and physical, ddx includes but is not limited to appendicitis, colitis, diverticulitis, PUD, cholecystitis, SBO. Plan for upright XR to eval for free air, CT, labs, UA, meds, reassess

## 2023-02-09 NOTE — ED ADULT NURSE NOTE - NS ED NURSE PATIENT LEFT UNIT TIME


Pregnant?: 


Alina Abreu PA [Primary Care Provider] - If Needed





- Billing Disposition and Condition


Condition: STABLE


Disposition: Home 01:56 02:34
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
## 2023-02-09 NOTE — ED PROVIDER NOTE - ATTENDING CONTRIBUTION TO CARE
51 yo male p/w abdominal pain.  some guarding noted on exam.  lipase elevated, CT abdomen/pelvis with evidence of acute pancreatitis.  unclear etiology, denies ETOH, triglycerides sent.  no gallstone path on CT, ultrasound ordered.  discussed with hospitalist --> admit for further management.

## 2023-02-10 DIAGNOSIS — R10.9 UNSPECIFIED ABDOMINAL PAIN: ICD-10-CM

## 2023-02-10 DIAGNOSIS — U07.1 COVID-19: ICD-10-CM

## 2023-02-10 DIAGNOSIS — K85.90 ACUTE PANCREATITIS WITHOUT NECROSIS OR INFECTION, UNSPECIFIED: ICD-10-CM

## 2023-02-10 DIAGNOSIS — R79.1 ABNORMAL COAGULATION PROFILE: ICD-10-CM

## 2023-02-10 LAB
ALBUMIN SERPL ELPH-MCNC: 3.5 G/DL — SIGNIFICANT CHANGE UP (ref 3.3–5)
ALP SERPL-CCNC: 69 U/L — SIGNIFICANT CHANGE UP (ref 40–120)
ALT FLD-CCNC: 17 U/L — SIGNIFICANT CHANGE UP (ref 10–45)
ANION GAP SERPL CALC-SCNC: 11 MMOL/L — SIGNIFICANT CHANGE UP (ref 5–17)
AST SERPL-CCNC: 14 U/L — SIGNIFICANT CHANGE UP (ref 10–40)
BILIRUB SERPL-MCNC: 0.5 MG/DL — SIGNIFICANT CHANGE UP (ref 0.2–1.2)
BUN SERPL-MCNC: 15 MG/DL — SIGNIFICANT CHANGE UP (ref 7–23)
CALCIUM SERPL-MCNC: 8.1 MG/DL — LOW (ref 8.4–10.5)
CHLORIDE SERPL-SCNC: 106 MMOL/L — SIGNIFICANT CHANGE UP (ref 96–108)
CO2 SERPL-SCNC: 22 MMOL/L — SIGNIFICANT CHANGE UP (ref 22–31)
CREAT SERPL-MCNC: 0.92 MG/DL — SIGNIFICANT CHANGE UP (ref 0.5–1.3)
EGFR: 99 ML/MIN/1.73M2 — SIGNIFICANT CHANGE UP
ETHANOL SERPL-MCNC: <10 MG/DL — SIGNIFICANT CHANGE UP (ref 0–10)
GLUCOSE BLDC GLUCOMTR-MCNC: 88 MG/DL — SIGNIFICANT CHANGE UP (ref 70–99)
GLUCOSE SERPL-MCNC: 88 MG/DL — SIGNIFICANT CHANGE UP (ref 70–99)
HCT VFR BLD CALC: 36.6 % — LOW (ref 39–50)
HGB BLD-MCNC: 11.8 G/DL — LOW (ref 13–17)
INR BLD: 1.28 RATIO — HIGH (ref 0.88–1.16)
MCHC RBC-ENTMCNC: 27.5 PG — SIGNIFICANT CHANGE UP (ref 27–34)
MCHC RBC-ENTMCNC: 32.2 GM/DL — SIGNIFICANT CHANGE UP (ref 32–36)
MCV RBC AUTO: 85.3 FL — SIGNIFICANT CHANGE UP (ref 80–100)
MRSA PCR RESULT.: SIGNIFICANT CHANGE UP
NRBC # BLD: 0 /100 WBCS — SIGNIFICANT CHANGE UP (ref 0–0)
PLATELET # BLD AUTO: 201 K/UL — SIGNIFICANT CHANGE UP (ref 150–400)
POTASSIUM SERPL-MCNC: 3.7 MMOL/L — SIGNIFICANT CHANGE UP (ref 3.5–5.3)
POTASSIUM SERPL-SCNC: 3.7 MMOL/L — SIGNIFICANT CHANGE UP (ref 3.5–5.3)
PROT SERPL-MCNC: 6.3 G/DL — SIGNIFICANT CHANGE UP (ref 6–8.3)
PROTHROM AB SERPL-ACNC: 14.8 SEC — HIGH (ref 10.5–13.4)
RBC # BLD: 4.29 M/UL — SIGNIFICANT CHANGE UP (ref 4.2–5.8)
RBC # FLD: 13.2 % — SIGNIFICANT CHANGE UP (ref 10.3–14.5)
S AUREUS DNA NOSE QL NAA+PROBE: DETECTED
SODIUM SERPL-SCNC: 139 MMOL/L — SIGNIFICANT CHANGE UP (ref 135–145)
TRIGL SERPL-MCNC: 88 MG/DL — SIGNIFICANT CHANGE UP
WBC # BLD: 8.39 K/UL — SIGNIFICANT CHANGE UP (ref 3.8–10.5)
WBC # FLD AUTO: 8.39 K/UL — SIGNIFICANT CHANGE UP (ref 3.8–10.5)

## 2023-02-10 PROCEDURE — 99223 1ST HOSP IP/OBS HIGH 75: CPT

## 2023-02-10 PROCEDURE — 76705 ECHO EXAM OF ABDOMEN: CPT | Mod: 26

## 2023-02-10 RX ORDER — CHLORHEXIDINE GLUCONATE 213 G/1000ML
1 SOLUTION TOPICAL DAILY
Refills: 0 | Status: DISCONTINUED | OUTPATIENT
Start: 2023-02-10 | End: 2023-02-14

## 2023-02-10 RX ORDER — LANOLIN ALCOHOL/MO/W.PET/CERES
3 CREAM (GRAM) TOPICAL AT BEDTIME
Refills: 0 | Status: DISCONTINUED | OUTPATIENT
Start: 2023-02-10 | End: 2023-02-14

## 2023-02-10 RX ORDER — ONDANSETRON 8 MG/1
4 TABLET, FILM COATED ORAL EVERY 8 HOURS
Refills: 0 | Status: DISCONTINUED | OUTPATIENT
Start: 2023-02-10 | End: 2023-02-14

## 2023-02-10 RX ORDER — SODIUM CHLORIDE 9 MG/ML
1000 INJECTION INTRAMUSCULAR; INTRAVENOUS; SUBCUTANEOUS
Refills: 0 | Status: DISCONTINUED | OUTPATIENT
Start: 2023-02-10 | End: 2023-02-13

## 2023-02-10 RX ORDER — OXYCODONE HYDROCHLORIDE 5 MG/1
2.5 TABLET ORAL EVERY 4 HOURS
Refills: 0 | Status: DISCONTINUED | OUTPATIENT
Start: 2023-02-10 | End: 2023-02-14

## 2023-02-10 RX ORDER — OXYCODONE HYDROCHLORIDE 5 MG/1
10 TABLET ORAL EVERY 4 HOURS
Refills: 0 | Status: DISCONTINUED | OUTPATIENT
Start: 2023-02-10 | End: 2023-02-14

## 2023-02-10 RX ORDER — OXYCODONE HYDROCHLORIDE 5 MG/1
5 TABLET ORAL EVERY 4 HOURS
Refills: 0 | Status: DISCONTINUED | OUTPATIENT
Start: 2023-02-10 | End: 2023-02-14

## 2023-02-10 RX ORDER — SENNA PLUS 8.6 MG/1
2 TABLET ORAL AT BEDTIME
Refills: 0 | Status: DISCONTINUED | OUTPATIENT
Start: 2023-02-10 | End: 2023-02-14

## 2023-02-10 RX ORDER — HEPARIN SODIUM 5000 [USP'U]/ML
5000 INJECTION INTRAVENOUS; SUBCUTANEOUS EVERY 12 HOURS
Refills: 0 | Status: DISCONTINUED | OUTPATIENT
Start: 2023-02-10 | End: 2023-02-14

## 2023-02-10 RX ORDER — SODIUM CHLORIDE 9 MG/ML
1000 INJECTION INTRAMUSCULAR; INTRAVENOUS; SUBCUTANEOUS
Refills: 0 | Status: DISCONTINUED | OUTPATIENT
Start: 2023-02-10 | End: 2023-02-10

## 2023-02-10 RX ORDER — ACETAMINOPHEN 500 MG
650 TABLET ORAL EVERY 6 HOURS
Refills: 0 | Status: DISCONTINUED | OUTPATIENT
Start: 2023-02-10 | End: 2023-02-14

## 2023-02-10 RX ORDER — POLYETHYLENE GLYCOL 3350 17 G/17G
17 POWDER, FOR SOLUTION ORAL
Refills: 0 | Status: DISCONTINUED | OUTPATIENT
Start: 2023-02-10 | End: 2023-02-14

## 2023-02-10 RX ADMIN — OXYCODONE HYDROCHLORIDE 5 MILLIGRAM(S): 5 TABLET ORAL at 06:13

## 2023-02-10 RX ADMIN — SODIUM CHLORIDE 100 MILLILITER(S): 9 INJECTION INTRAMUSCULAR; INTRAVENOUS; SUBCUTANEOUS at 06:14

## 2023-02-10 RX ADMIN — CHLORHEXIDINE GLUCONATE 1 APPLICATION(S): 213 SOLUTION TOPICAL at 12:29

## 2023-02-10 RX ADMIN — OXYCODONE HYDROCHLORIDE 5 MILLIGRAM(S): 5 TABLET ORAL at 16:42

## 2023-02-10 RX ADMIN — SENNA PLUS 2 TABLET(S): 8.6 TABLET ORAL at 21:17

## 2023-02-10 RX ADMIN — SODIUM CHLORIDE 75 MILLILITER(S): 9 INJECTION INTRAMUSCULAR; INTRAVENOUS; SUBCUTANEOUS at 16:34

## 2023-02-10 RX ADMIN — POLYETHYLENE GLYCOL 3350 17 GRAM(S): 17 POWDER, FOR SOLUTION ORAL at 16:42
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
## 2023-02-10 NOTE — PATIENT PROFILE ADULT - FUNCTIONAL ASSESSMENT - BASIC MOBILITY 4.
Please return for lab fasting (nothing to eat or drink for 10-12 hours, except water/black coffee ok) 2-3 days prior to your next visit in 6 months.    Lab weekdays-    8-5 M-F [place]    Saturdays [place]by [place])    8-11am      This will allow us to discuss the lab results and make any necessary medication adjustments at the next appointment.    Please attempt to arrive to your appointment 15-20 minutes prior to your scheduled time to allow for rooming and paperwork      Check with [employer] re:TdaP--as due for immunization    Medicare Wellness Visit  Plan for Preventive Care    A good way for you to stay healthy is to use preventive care.  Medicare covers many services that can help you stay healthy.* The goal of these services is to find any health problems as quickly as possible. Finding problems early can help make them easier to treat.  Your personal plan below lists the services you may need and when they are due.     Health Maintenance Summary     Topic Due On Due Status Completed On Postpone Until Reason    Osteoporosis Screening  Completed May 26, 2011      Diabetes Eye Exam Apr 24, 2019 Not Due Apr 24, 2018      Glycohemoglobin A1C  (Diabetes Sugar)  Jan 2, 2019 Not Due Jul 2, 2018      GFR  (Kidney Function Test)  Dec 27, 2018 Not Due Dec 27, 2017      Diabetes Foot Exam  Jan 23, 2018 Overdue Jan 23, 2017      Medicare Wellness Visit Jul 18, 2017 Overdue Jul 18, 2016      IMMUNIZATION - DTaP/Tdap/Td Jan 2, 1997 Postponed Jan 1, 1997 Jan 9, 2019 Insurance or Financial    Immunization-Influenza Sep 1, 2018 Not Due Oct 10, 2017      Depression Screening Jul 9, 2019 Not Due Jul 9, 2018 Jul 13, 2016 Overdue Jul 13, 2015             Preventive Care for Women and Men    Heart Screenings (Cardiovascular):  · Blood tests are used to check your cholesterol, lipid and triglyceride levels. High levels can increase your risk for heart disease and stroke. High levels can be treated with  medications, diet and exercise. Lowering your levels can help keep your heart and blood vessels healthy.  Your provider will order these tests if they are needed.    · An ultrasound is done to see if you have an abdominal aortic aneurysm (AAA).  This is an enlargement of one of the main blood vessels that delivers blood to the body.   In the [place], 9,000 deaths are caused by AAA.  You may not even know you have this problem and as many as 1 in 3 people will have a serious problem if it is not treated.  Early diagnosis allows for more effective treatment and cure.  If you have a family history of AAA or are a male age 65-75 who has smoked, you are at higher risk of an AAA.  Your provider can order this test, if needed.    Colorectal Screening:  · There are many tests that are used to check for cancer of your colon and rectum. You and your provider should discuss what test is best for you and when to have it done.  Options include:  · Screening Colonoscopy: exam of the entire colon, seen through a flexible lighted tube.  · Flexible Sigmoidoscopy: exam of the last third (sigmoid portion) of the colon and rectum, seen through a flexible lighted tube.  · Cologuard DNA stool test: a sample of your stool is used to screen for cancer and unseen blood in your stool.  · Fecal Occult Blood Test: a sample of your stool is studied to find any unseen blood    Flu Shot:  · An immunization that helps to prevent influenza (the flu). You should get this every year. The best time to get the shot is in the fall.    Pneumococcal Shot:  • Vaccines are available that can help prevent pneumococcal disease, which is any type of infection caused by Streptococcus pneumoniae bacteria.   Their use can prevent some cases of pneumonia, meningitis, and sepsis. There are two types of pneumococcal vaccines:   o Conjugate vaccines (PCV-13 or Prevnar 13®) - helps protect against the 13 types of pneumococcal bacteria that are the most common  causes of serious infections in children and adults.    o Polysaccharide vaccine (PPSV23 or Pjwpimazn27®) - helps protect against 23 types of pneumococcal bacteria for patients who are recommended to get it.  These vaccines should be given at least 12 months apart.  A booster is usually not needed.     Hepatitis B Shot:  · An immunization that helps to protect people from getting Hepatitis B. Hepatitis B is a virus that spreads through contact with infected blood or body fluids. Many people with the virus do not have symptoms.  The virus can lead to serious problems, such as liver disease. Some people are at higher risk than others. Your doctor will tell you if you need this shot.     Diabetes Screening:  · A test to measure sugar (glucose) in your blood is called a fasting blood sugar. Fasting means you cannot have food or drink for at least 8 hours before the test. This test can detect diabetes long before you may notice symptoms.    Glaucoma Screening:  · Glaucoma screening is performed by your eye doctor. The test measures the fluid pressure inside your eyes to determine if you have glaucoma.     Hepatitis C Screening:  · A blood test to see if you have the hepatitis C virus.  Hepatitis C attacks the liver and is a major cause of chronic liver disease.  Medicare will cover a single screening for all adults born between 1945 & 1965, or high risk patients (people who have injected illegal drugs or people who have had blood transfusions).  High risk patients who continue to inject illegal drugs can be screened for Hepatitis C every year.    Smoking and Tobacco-Use Cessation Counseling:  · Tobacco is the single greatest cause of disease and early death in our country today. Medication and counseling together can increase a person’s chance of quitting for good.   · Medicare covers two quitting attempts per year, with four counseling sessions per attempt (eight sessions in a 12 month period)    Preventive Screening  tests for Women    Screening Mammograms and Breast Exams:  · An x-ray of your breasts to check for breast cancer before you or your doctor may be able to feel it.  If breast cancer is found early it can usually be treated with success.    Pelvic Exams and Pap Tests:  · An exam to check for cervical and vaginal cancer. A Pap test is a lab test in which cells are taken from your cervix and sent to the lab to look for signs of cervical cancer. If cancer of the cervix is found early, chances for a cure are good. Testing can generally end at age 65, or if a woman has a hysterectomy for a benign condition. Your provider may recommend more frequent testing if certain abnormal results are found.    Bone Mass Measurements:  · A painless x-ray of your bone density to see if you are at risk for a broken bone. Bone density refers to the thickness of bones or how tightly the bone tissue is packed.    Preventive Screening tests for Men    Prostate Screening:  · PSA - Prostate Cancer blood test.  Experts do not recommend routine screening of healthy men with no signs or symptoms of prostate disease.  However, men should not ignore urinary symptoms, and should discuss their family history with their doctor.    *Medicare pays for many preventive services to keep you healthy. For some of these services, you might have to pay a deductible, coinsurance, and / or copayment.  The amounts vary depending on the type of services you need and the kind of Medicare health plan you have.               4 = No assist / stand by assistance

## 2023-02-10 NOTE — H&P ADULT - PROBLEM SELECTOR PLAN 3
COVID+ on lab test  - Afebrile, VSS, Sats > 95% RA, no dyspnea  - No indication for steroids, supportive care   - Monitor resp status  - Airborne precaution per unit protocol

## 2023-02-10 NOTE — H&P ADULT - PROBLEM SELECTOR PLAN 1
- Here for LLQ abdominal pain x2d astd w/ ausea, decreased appetite, and constipation  - afebrile VSS, Nontoxic  - CT a/p: Acute pancreatitis. No peripancreatic collection or evidence of necrosis  DDx: Gallstone vs HyperTG vs Hyper Ca vs Etoh pancreatitis    - cbc & chem wnl, no leukocytosis  - lipase elevated (415),   - TG- nl, Ca nl pancreatitis 2/2 HyperTG or Hyper Ca less probable   - LFT & bili also norm, gallstone pancreatitis less likely   - Etoh use, check BAL   - COVID+  - CXR neg   - s/p 2L NS bolus, zofran, morphine in ED  - c/w maintenance IVF   - Supportive care: prn Tylenol, Zofran  - IVF, pain control   - Advance diet as tolerates     - Dispo pending clinical improvement - Here for LLQ abdominal pain x2d astd w/ nausea, decreased appetite, and constipation  - afebrile VSS, Nontoxic  - CT a/p: Acute pancreatitis. No peripancreatic collection or evidence of necrosis  DDx: Gallstone vs HyperTG vs Hyper Ca vs Etoh pancreatitis    - cbc & chem wnl, no leukocytosis  - lipase elevated (415)  - TG- nl, Ca nl-- pancreatitis 2/2 HyperTG or Hyper Ca less probable   - LFT & bili also norm, gallstone pancreatitis less likely but will check Abm U/S for completeness   - Endorse intermittent etoh use and occasional THC use. Will check drug screen & BAL  - COVID+  - CXR neg   - s/p 2L NS bolus, zofran, morphine in ED  - c/w maintenance IVF   - Supportive care: prn pain med, Zofran  - Advance diet as tolerates     - Dispo pending clinical improvement

## 2023-02-10 NOTE — PROGRESS NOTE ADULT - SUBJECTIVE AND OBJECTIVE BOX
Name of Patient : DEBORAH BARTHOLOMEW  MRN: [id number]  Date of visit: 02-10-23 @ 15:28      Subjective: Patient seen and examined. No new events except as noted.   Patient seen earlier this AM. Daughter updated via telephone at the patient's bedside. NS pacific  offered, patient opted for daughter as  via telephone.   Patient reports abdominal discomfort with regular diet. Will decrease to full liquid diet.   Reports no BM X 3 days. Laxatives ordered  Denies any respiratory complaints.     REVIEW OF SYSTEMS:    CONSTITUTIONAL: Generalized weakness   EYES/ENT: No visual changes;  No vertigo or throat pain   NECK: No pain or stiffness  RESPIRATORY: No cough, wheezing, hemoptysis; No shortness of breath  CARDIOVASCULAR: No chest pain or palpitations  GASTROINTESTINAL: +LLQ pain; Pain with diet this AM; Constipation   GENITOURINARY: No dysuria, frequency or hematuria  NEUROLOGICAL: No numbness or weakness  SKIN: No itching, burning, rashes, or lesions   All other review of systems is negative unless indicated above.    MEDICATIONS:  MEDICATIONS  (STANDING):  chlorhexidine 2% Cloths 1 Application(s) Topical daily  senna 2 Tablet(s) Oral at bedtime  sodium chloride 0.9%. 1000 milliLiter(s) (100 mL/Hr) IV Continuous <Continuous>      PHYSICAL EXAM:  T(C): 36.6 (02-10-23 @ 12:48), Max: 36.9 (02-09-23 @ 16:14)  HR: 75 (02-10-23 @ 12:48) (71 - 77)  BP: 118/74 (02-10-23 @ 12:48) (109/71 - 118/74)  RR: 18 (02-10-23 @ 12:48) (18 - 18)  SpO2: 96% (02-10-23 @ 12:48) (94% - 96%)  Wt(kg): --  I&O's Summary        Appearance: Normal	  HEENT: Eyes are open   Lymphatic: No lymphadenopathy   Cardiovascular: Normal S1 S2, no JVD  Respiratory: normal effort , clear  Gastrointestinal:  Soft, Mildly tender to palpitation   Skin: No rashes,  warm to touch  Psychiatry:  Mood & affect appropriate  Musculoskeletal: No edema                          11.8   8.39  )-----------( 201      ( 10 Feb 2023 06:52 )             36.6               02-10    139  |  106  |  15  ----------------------------<  88  3.7   |  22  |  0.92    Ca    8.1<L>      10 Feb 2023 06:51    TPro  6.3  /  Alb  3.5  /  TBili  0.5  /  DBili  x   /  AST  14  /  ALT  17  /  AlkPhos  69  02-10    PT/INR - ( 10 Feb 2023 06:53 )   PT: 14.8 sec;   INR: 1.28 ratio         PTT - ( 09 Feb 2023 17:57 )  PTT:27.4 sec                     < from: CT Abdomen and Pelvis w/ IV Cont (02.09.23 @ 19:53) >  IMPRESSION:  Acute pancreatitis. No peripancreatic collection or evidence of necrosis.    Again noted is nonspecific thickened appearance of the distal penis with   foci of air, which may represent foci of air trapped underneath prominent   foreskin. Correlate with physical exam.    < end of copied text >

## 2023-02-10 NOTE — CONSULT NOTE ADULT - ASSESSMENT
ASSESSMENT: 52M no PMH presenting to the ED with LLQ abdominal pain for the past 2 days associated with nausea, decreased appetite, and constipation (small BM today). Imaging and labs consistent with acute pancreatitis.    PLAN:    - no acute surgical intervention indicated   - pt afebrile, hemodynamically stable   - US negative for gallstone   - r/o other medical causes of pancreatitis, check TG levles   - pain control prn  - CLD     - Plan discussed with Attending, Dr. Fierro    ACS  [id number]

## 2023-02-10 NOTE — H&P ADULT - NSHPPHYSICALEXAM_GEN_ALL_CORE
Vital Signs Last 24 Hrs  T(C): 36.7 (10 Feb 2023 02:53), Max: 36.9 (09 Feb 2023 16:14)  T(C): 36.7 (02-10-23 @ 02:53), Max: 36.9 (02-09-23 @ 16:14)  HR: 73 (02-10-23 @ 02:53) (71 - 77)  BP: 109/71 (02-10-23 @ 02:53) (109/67 - 117/71)  RR: 18 (02-10-23 @ 02:53) (18 - 18)  SpO2: 95% (02-10-23 @ 02:53) (94% - 95%)    CONSTITUTIONAL: Well groomed, no apparent distress  EYES: PERRLA and symmetric, EOMI, No conjunctival or scleral injection, non-icteric  ENMT: MMM. Normal dentition; no pharyngeal injection or exudates  NECK: Supple, symmetric and without tracheal deviation   RESP: No respiratory distress, no use of accessory muscles; CTA b/l, no WRR  CV: +S1S2, RRR, no MRG; no JVD; no peripheral edema  GI: Soft, NTND, no RGR; no palpable masses; no hepatosplenomegaly  LYMPH: No cervical or axillary LAD or tenderness  MSK: Normal gait; No digital clubbing or cyanosis; normal pain free ROM x4 extremities   SKIN: No rashes or ulcers noted; no subcutaneous nodules or induration palpable  NEURO: CN II-XII grossly intact; normal reflexes, sensation intact throughout   PSYCH: Appropriate insight/judgment; A+O x 3, mood and affect appropriate Vital Signs Last 24 Hrs  T(C): 36.7 (10 Feb 2023 02:53), Max: 36.9 (09 Feb 2023 16:14)  T(C): 36.7 (02-10-23 @ 02:53), Max: 36.9 (02-09-23 @ 16:14)  HR: 73 (02-10-23 @ 02:53) (71 - 77)  BP: 109/71 (02-10-23 @ 02:53) (109/67 - 117/71)  RR: 18 (02-10-23 @ 02:53) (18 - 18)  SpO2: 95% (02-10-23 @ 02:53) (94% - 95%)    CONSTITUTIONAL: Well groomed, no apparent distress  EYES: PERRLA and symmetric, EOMI, No conjunctival or scleral injection, non-icteric  ENMT: MMM. Normal dentition; no pharyngeal injection or exudates  NECK: Supple, symmetric and without tracheal deviation   RESP: No respiratory distress, no use of accessory muscles; CTA b/l, no WRR  CV: +S1S2, RRR, no MRG; no JVD; no peripheral edema  GI: Soft, LLQ tender to deep palpation, mild guarding, no rebound tenderness   MSK: Normal gait; No digital clubbing or cyanosis; normal pain free ROM x4 extremities   SKIN: No rashes or ulcers noted; no subcutaneous nodules or induration palpable  NEURO: CN II-XII grossly intact; normal reflexes, sensation intact throughout   PSYCH: Appropriate insight/judgment; A+O x 3, mood and affect appropriate
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
## 2023-02-10 NOTE — H&P ADULT - NSVTERISKASSESS_GEN_ALL_CORE FT
Medical Assessment Completed on: 10-Feb-2023 05:37 no melena/no diarrhea/no abdominal pain/no vomiting

## 2023-02-10 NOTE — H&P ADULT - PROBLEM SELECTOR PLAN 2
As above - Abnorm coag, not on AC  - Unclear why coag abnorm   - ?acute process vs COVID vs transient reactive process   - trend labs   - If remains abnorm will need coagulopathy w/u

## 2023-02-10 NOTE — H&P ADULT - NSHPLABSRESULTS_GEN_ALL_CORE
13.9   9.02  )-----------( 246      ( 09 Feb 2023 17:57 )             43.3       02-09    140  |  105  |  22  ----------------------------<  91  4.8   |  23  |  0.99    Ca    9.1      09 Feb 2023 17:57    TPro  7.8  /  Alb  4.0  /  TBili  0.5  /  DBili  x   /  AST  34  /  ALT  22  /  AlkPhos  90  02-09      EKG personally reviewed:      Images personally reviewed:     < from: Xray Chest 2 Views PA/Lat (02.09.23 @ 18:39) >  FINDINGS:  The heart is normal in size.  Trace left basilar subsegmental atelectasis. No focal consolidations.  There is no pneumothorax or pleural effusion.  No free intraperitoneal air seen under the diaphragm.  No acute bony normalities.      < from: CT Abdomen and Pelvis w/ IV Cont (02.09.23 @ 19:53) >  IMPRESSION:  Acute pancreatitis. No peripancreatic collection or evidence of necrosis.    Again noted is nonspecific thickened appearance of the distal penis with   foci of air, which may represent foci of air trapped underneath prominent   foreskin. Correlate with physical exam.

## 2023-02-11 ENCOUNTER — TRANSCRIPTION ENCOUNTER (OUTPATIENT)
Age: 53
End: 2023-02-11

## 2023-02-11 LAB
AMYLASE P1 CFR SERPL: 76 U/L — SIGNIFICANT CHANGE UP (ref 25–125)
CHOLEST SERPL-MCNC: 143 MG/DL — SIGNIFICANT CHANGE UP
GLUCOSE BLDC GLUCOMTR-MCNC: 110 MG/DL — HIGH (ref 70–99)
GLUCOSE BLDC GLUCOMTR-MCNC: 94 MG/DL — SIGNIFICANT CHANGE UP (ref 70–99)
HDLC SERPL-MCNC: 28 MG/DL — LOW
LIDOCAIN IGE QN: 114 U/L — HIGH (ref 7–60)
LIPID PNL WITH DIRECT LDL SERPL: 100 MG/DL — HIGH
NON HDL CHOLESTEROL: 116 MG/DL — SIGNIFICANT CHANGE UP
TRIGL SERPL-MCNC: 77 MG/DL — SIGNIFICANT CHANGE UP

## 2023-02-11 RX ADMIN — HEPARIN SODIUM 5000 UNIT(S): 5000 INJECTION INTRAVENOUS; SUBCUTANEOUS at 17:56

## 2023-02-11 RX ADMIN — SENNA PLUS 2 TABLET(S): 8.6 TABLET ORAL at 21:52

## 2023-02-11 RX ADMIN — HEPARIN SODIUM 5000 UNIT(S): 5000 INJECTION INTRAVENOUS; SUBCUTANEOUS at 05:39

## 2023-02-11 RX ADMIN — CHLORHEXIDINE GLUCONATE 1 APPLICATION(S): 213 SOLUTION TOPICAL at 11:21

## 2023-02-11 RX ADMIN — SODIUM CHLORIDE 75 MILLILITER(S): 9 INJECTION INTRAMUSCULAR; INTRAVENOUS; SUBCUTANEOUS at 05:41

## 2023-02-11 RX ADMIN — OXYCODONE HYDROCHLORIDE 2.5 MILLIGRAM(S): 5 TABLET ORAL at 18:10

## 2023-02-11 NOTE — PROGRESS NOTE ADULT - SUBJECTIVE AND OBJECTIVE BOX
Name of Patient : DEBORAH BARTHOLOMEW  MRN: [id number]  Date of visit: 02-11-23 @ 18:01      Subjective: Patient seen and examined. No new events except as noted.   Doing okay  cont to have abdominal pain, improved however     REVIEW OF SYSTEMS:    CONSTITUTIONAL: No weakness, fevers or chills  EYES/ENT: No visual changes;  No vertigo or throat pain   NECK: No pain or stiffness  RESPIRATORY: No cough, wheezing, hemoptysis; No shortness of breath  CARDIOVASCULAR: No chest pain or palpitations  GASTROINTESTINAL: + epigastric pain   GENITOURINARY: No dysuria, frequency or hematuria  NEUROLOGICAL: No numbness or weakness  SKIN: No itching, burning, rashes, or lesions   All other review of systems is negative unless indicated above.    MEDICATIONS:  MEDICATIONS  (STANDING):  chlorhexidine 2% Cloths 1 Application(s) Topical daily  heparin   Injectable 5000 Unit(s) SubCutaneous every 12 hours  senna 2 Tablet(s) Oral at bedtime  sodium chloride 0.9%. 1000 milliLiter(s) (75 mL/Hr) IV Continuous <Continuous>      PHYSICAL EXAM:  T(C): 36.5 (02-11-23 @ 11:59), Max: 36.9 (02-11-23 @ 05:00)  HR: 67 (02-11-23 @ 11:59) (67 - 83)  BP: 112/67 (02-11-23 @ 11:59) (106/64 - 113/66)  RR: 18 (02-11-23 @ 11:59) (18 - 18)  SpO2: 95% (02-11-23 @ 11:59) (94% - 95%)  Wt(kg): --  I&O's Summary        Appearance: Normal	  HEENT:  PERRLA   Lymphatic: No lymphadenopathy   Cardiovascular: Normal S1 S2, no JVD  Respiratory: normal effort , clear  Gastrointestinal:  Soft, epigastriuc pain   Skin: No rashes,  warm to touch  Psychiatry:  Mood & affect appropriate  Musculuskeletal: No edema    recent labs, Imaging and EKGs personally reviewed                             11.8   8.39  )-----------( 201      ( 10 Feb 2023 06:52 )             36.6               02-10    139  |  106  |  15  ----------------------------<  88  3.7   |  22  |  0.92    Ca    8.1<L>      10 Feb 2023 06:51    TPro  6.3  /  Alb  3.5  /  TBili  0.5  /  DBili  x   /  AST  14  /  ALT  17  /  AlkPhos  69  02-10    PT/INR - ( 10 Feb 2023 06:53 )   PT: 14.8 sec;   INR: 1.28 ratio

## 2023-02-11 NOTE — DISCHARGE NOTE PROVIDER - NSDCFUADDAPPT_GEN_ALL_CORE_FT
APPTS ARE READY TO BE MADE: [x ] YES    Best Family or Patient Contact (if needed): Patient.     APPTS ARE READY TO BE MADE: [x ] YES    Best Family or Patient Contact (if needed): Patient.    Patient was provided with follow up request details and was warm transferred to the office to schedule the appointment on their own.

## 2023-02-11 NOTE — DISCHARGE NOTE PROVIDER - CARE PROVIDER_API CALL
Robbin Isaac  PCP  Phone: [phone]  Fax: (   )    -  Established Patient  Follow Up Time: 1 week
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
## 2023-02-11 NOTE — DISCHARGE NOTE PROVIDER - NSDCQMCOGNITION_NEU_ALL_CORE
VSS.  Pt is up SBA x1.  Pt is due to discharge to TCU at 1700 per MD orders.  Lap sites are C/D/I.  Pt is voiding in the bathroom, some drops of blood noted after urination, pt is still on antibiotics per MD orders. Pt denies pain.   No difficulties

## 2023-02-11 NOTE — PROGRESS NOTE ADULT - SUBJECTIVE AND OBJECTIVE BOX
SURGERY DAILY PROGRESS NOTE:     SUBJECTIVE/ROS: Patient seen at bedside this AM. Mild epigastric tenderness, improved from yesterday. Denies N/v, tolerating clears.     24h Events:   - Overnight, no acute events    OBJECTIVE:  Vital Signs Last 24 Hrs  T(C): 36.9 (11 Feb 2023 05:00), Max: 36.9 (11 Feb 2023 05:00)  T(F): 98.4 (11 Feb 2023 05:00), Max: 98.4 (11 Feb 2023 05:00)  HR: 70 (11 Feb 2023 05:00) (70 - 83)  BP: 106/64 (11 Feb 2023 05:00) (106/64 - 118/74)  BP(mean): --  RR: 18 (11 Feb 2023 05:00) (18 - 18)  SpO2: 94% (11 Feb 2023 05:00) (94% - 96%)    Parameters below as of 11 Feb 2023 05:00  Patient On (Oxygen Delivery Method): room air      I&O's Detail    Daily     Daily   MEDICATIONS  (STANDING):  chlorhexidine 2% Cloths 1 Application(s) Topical daily  heparin   Injectable 5000 Unit(s) SubCutaneous every 12 hours  senna 2 Tablet(s) Oral at bedtime  sodium chloride 0.9%. 1000 milliLiter(s) (75 mL/Hr) IV Continuous <Continuous>    MEDICATIONS  (PRN):  acetaminophen     Tablet .. 650 milliGRAM(s) Oral every 6 hours PRN Temp greater or equal to 38C (100.4F), Mild Pain (1 - 3)  aluminum hydroxide/magnesium hydroxide/simethicone Suspension 30 milliLiter(s) Oral every 4 hours PRN Dyspepsia  melatonin 3 milliGRAM(s) Oral at bedtime PRN Insomnia  ondansetron Injectable 4 milliGRAM(s) IV Push every 8 hours PRN Nausea and/or Vomiting  oxyCODONE    IR 2.5 milliGRAM(s) Oral every 4 hours PRN Moderate Pain (4 - 6)  oxyCODONE    IR 5 milliGRAM(s) Oral every 4 hours PRN Severe Pain (7 - 10)  oxyCODONE    IR 10 milliGRAM(s) Oral every 4 hours PRN Severe Pain (7 - 10)  polyethylene glycol 3350 17 Gram(s) Oral two times a day PRN Constipation      LABS:                        11.8   8.39  )-----------( 201      ( 10 Feb 2023 06:52 )             36.6     02-10    139  |  106  |  15  ----------------------------<  88  3.7   |  22  |  0.92    Ca    8.1<L>      10 Feb 2023 06:51    TPro  6.3  /  Alb  3.5  /  TBili  0.5  /  DBili  x   /  AST  14  /  ALT  17  /  AlkPhos  69  02-10    PT/INR - ( 10 Feb 2023 06:53 )   PT: 14.8 sec;   INR: 1.28 ratio         PTT - ( 09 Feb 2023 17:57 )  PTT:27.4 sec      PHYSICAL EXAM:  Gen: AAOx3, non-toxic  Head: NCAT  HEENT: EOMI, oral mucosa moist, normal conjunctiva  Lung: Breathing on RA, unlabored   Abd: soft, NTND, no guarding. +Epigastric tenderness   MSK: no visible deformities  Neuro: No focal sensory or motor deficits

## 2023-02-11 NOTE — DISCHARGE NOTE PROVIDER - NSDCCPCAREPLAN_GEN_ALL_CORE_FT
PRINCIPAL DISCHARGE DIAGNOSIS  Diagnosis: Pancreatitis  Assessment and Plan of Treatment:        PRINCIPAL DISCHARGE DIAGNOSIS  Diagnosis: Pancreatitis  Assessment and Plan of Treatment: s/p IVL       PRINCIPAL DISCHARGE DIAGNOSIS  Diagnosis: Pancreatitis  Assessment and Plan of Treatment: s/p normal saline hydration  Pain control         SECONDARY DISCHARGE DIAGNOSES  Diagnosis: 2019 novel coronavirus disease (COVID-19)  Assessment and Plan of Treatment:     Diagnosis: Abnormal INR  Assessment and Plan of Treatment:      PRINCIPAL DISCHARGE DIAGNOSIS  Diagnosis: Pancreatitis  Assessment and Plan of Treatment: s/p normal saline hydration  Pain control         SECONDARY DISCHARGE DIAGNOSES  Diagnosis: 2019 novel coronavirus disease (COVID-19)  Assessment and Plan of Treatment: No indications for steroids; continue with supportive care    Diagnosis: Abnormal INR  Assessment and Plan of Treatment: Unknown etiology; trend     PRINCIPAL DISCHARGE DIAGNOSIS  Diagnosis: Pancreatitis  Assessment and Plan of Treatment: Resolved.  Pain control.  Follow up with your PMD for monitoring.   Seek medical advice for any recurrent abdominal pain.         SECONDARY DISCHARGE DIAGNOSES  Diagnosis: Thyroid nodule  Assessment and Plan of Treatment: Thyroid nodule noted on CTAngiogram Scan.  Follow up with Endocrine Clinic within 1  week after discharge for management.  Call to schedule appointment.  VERY IMPORATNT THAT YOU FOLLOW UP FOR MANAGEMENT.    Diagnosis: Abnormal INR  Assessment and Plan of Treatment: Unknown etiology.  Follow up with your PCP for monitoring and management.    Diagnosis: 2019 novel coronavirus disease (COVID-19)  Assessment and Plan of Treatment: No indications for steroids; continue with supportive care

## 2023-02-11 NOTE — DISCHARGE NOTE PROVIDER - PROVIDER TOKENS
FREE:[LAST:[Poncho],FIRST:[Robbin],PHONE:[phone]],FAX:[(   )    -],ADDRESS:[PCP],FOLLOWUP:[1 week],ESTABLISHEDPATIENT:[T]]

## 2023-02-11 NOTE — DISCHARGE NOTE PROVIDER - NSFOLLOWUPCLINICS_GEN_ALL_ED_FT
[facility]  Endocrinology  [address] [place] [address]  Phone: [phone]  Fax:   Follow Up Time: 1 week

## 2023-02-12 LAB
ALBUMIN SERPL ELPH-MCNC: 3.1 G/DL — LOW (ref 3.3–5)
ALP SERPL-CCNC: 79 U/L — SIGNIFICANT CHANGE UP (ref 40–120)
ALT FLD-CCNC: 36 U/L — SIGNIFICANT CHANGE UP (ref 10–45)
ANION GAP SERPL CALC-SCNC: 14 MMOL/L — SIGNIFICANT CHANGE UP (ref 5–17)
AST SERPL-CCNC: 30 U/L — SIGNIFICANT CHANGE UP (ref 10–40)
BILIRUB SERPL-MCNC: 0.4 MG/DL — SIGNIFICANT CHANGE UP (ref 0.2–1.2)
BUN SERPL-MCNC: 11 MG/DL — SIGNIFICANT CHANGE UP (ref 7–23)
CALCIUM SERPL-MCNC: 8.5 MG/DL — SIGNIFICANT CHANGE UP (ref 8.4–10.5)
CHLORIDE SERPL-SCNC: 107 MMOL/L — SIGNIFICANT CHANGE UP (ref 96–108)
CO2 SERPL-SCNC: 21 MMOL/L — LOW (ref 22–31)
CREAT SERPL-MCNC: 0.93 MG/DL — SIGNIFICANT CHANGE UP (ref 0.5–1.3)
CRP SERPL-MCNC: 91 MG/L — HIGH (ref 0–4)
D DIMER BLD IA.RAPID-MCNC: 902 NG/ML DDU — HIGH
EGFR: 98 ML/MIN/1.73M2 — SIGNIFICANT CHANGE UP
FERRITIN SERPL-MCNC: 432 NG/ML — HIGH (ref 30–400)
GLUCOSE SERPL-MCNC: 97 MG/DL — SIGNIFICANT CHANGE UP (ref 70–99)
HCT VFR BLD CALC: 36.6 % — LOW (ref 39–50)
HGB BLD-MCNC: 11.8 G/DL — LOW (ref 13–17)
LDH SERPL L TO P-CCNC: 186 U/L — SIGNIFICANT CHANGE UP (ref 50–242)
MAGNESIUM SERPL-MCNC: 1.9 MG/DL — SIGNIFICANT CHANGE UP (ref 1.6–2.6)
MCHC RBC-ENTMCNC: 27.2 PG — SIGNIFICANT CHANGE UP (ref 27–34)
MCHC RBC-ENTMCNC: 32.2 GM/DL — SIGNIFICANT CHANGE UP (ref 32–36)
MCV RBC AUTO: 84.3 FL — SIGNIFICANT CHANGE UP (ref 80–100)
NRBC # BLD: 0 /100 WBCS — SIGNIFICANT CHANGE UP (ref 0–0)
PHOSPHATE SERPL-MCNC: 3.7 MG/DL — SIGNIFICANT CHANGE UP (ref 2.5–4.5)
PLATELET # BLD AUTO: 241 K/UL — SIGNIFICANT CHANGE UP (ref 150–400)
POTASSIUM SERPL-MCNC: 4.1 MMOL/L — SIGNIFICANT CHANGE UP (ref 3.5–5.3)
POTASSIUM SERPL-SCNC: 4.1 MMOL/L — SIGNIFICANT CHANGE UP (ref 3.5–5.3)
PROCALCITONIN SERPL-MCNC: 0.27 NG/ML — HIGH (ref 0.02–0.1)
PROT SERPL-MCNC: 6.6 G/DL — SIGNIFICANT CHANGE UP (ref 6–8.3)
RBC # BLD: 4.34 M/UL — SIGNIFICANT CHANGE UP (ref 4.2–5.8)
RBC # FLD: 13.1 % — SIGNIFICANT CHANGE UP (ref 10.3–14.5)
SODIUM SERPL-SCNC: 142 MMOL/L — SIGNIFICANT CHANGE UP (ref 135–145)
WBC # BLD: 5.66 K/UL — SIGNIFICANT CHANGE UP (ref 3.8–10.5)
WBC # FLD AUTO: 5.66 K/UL — SIGNIFICANT CHANGE UP (ref 3.8–10.5)

## 2023-02-12 PROCEDURE — 93970 EXTREMITY STUDY: CPT | Mod: 26

## 2023-02-12 RX ORDER — MUPIROCIN 20 MG/G
1 OINTMENT TOPICAL
Refills: 0 | Status: DISCONTINUED | OUTPATIENT
Start: 2023-02-12 | End: 2023-02-14

## 2023-02-12 RX ADMIN — HEPARIN SODIUM 5000 UNIT(S): 5000 INJECTION INTRAVENOUS; SUBCUTANEOUS at 18:13

## 2023-02-12 RX ADMIN — CHLORHEXIDINE GLUCONATE 1 APPLICATION(S): 213 SOLUTION TOPICAL at 11:52

## 2023-02-12 RX ADMIN — HEPARIN SODIUM 5000 UNIT(S): 5000 INJECTION INTRAVENOUS; SUBCUTANEOUS at 05:14

## 2023-02-12 RX ADMIN — MUPIROCIN 1 APPLICATION(S): 20 OINTMENT TOPICAL at 18:14

## 2023-02-12 NOTE — PROGRESS NOTE ADULT - SUBJECTIVE AND OBJECTIVE BOX
Name of Patient : DEBORAH BARTHOLOMEW  MRN: [id number]  Date of visit: 02-12-23     Subjective: Patient seen and examined. No new events except as noted.   Doing okay     REVIEW OF SYSTEMS:    CONSTITUTIONAL: No weakness, fevers or chills  EYES/ENT: No visual changes;  No vertigo or throat pain   NECK: No pain or stiffness  RESPIRATORY: No cough, wheezing, hemoptysis; No shortness of breath  CARDIOVASCULAR: No chest pain or palpitations  GASTROINTESTINAL: + abdominal pain   GENITOURINARY: No dysuria, frequency or hematuria  NEUROLOGICAL: No numbness or weakness  SKIN: No itching, burning, rashes, or lesions   All other review of systems is negative unless indicated above.    MEDICATIONS:  MEDICATIONS  (STANDING):  chlorhexidine 2% Cloths 1 Application(s) Topical daily  heparin   Injectable 5000 Unit(s) SubCutaneous every 12 hours  mupirocin 2% Ointment 1 Application(s) Both Nostrils two times a day  senna 2 Tablet(s) Oral at bedtime  sodium chloride 0.9%. 1000 milliLiter(s) (75 mL/Hr) IV Continuous <Continuous>      PHYSICAL EXAM:  T(C): 36.7 (02-12-23 @ 21:19), Max: 36.8 (02-12-23 @ 05:18)  HR: 68 (02-12-23 @ 21:19) (67 - 80)  BP: 131/77 (02-12-23 @ 21:19) (105/61 - 131/77)  RR: 18 (02-12-23 @ 21:19) (16 - 18)  SpO2: 94% (02-12-23 @ 21:19) (93% - 95%)  Wt(kg): --  I&O's Summary        Appearance: Normal	  HEENT:  PERRLA   Lymphatic: No lymphadenopathy   Cardiovascular: Normal S1 S2, no JVD  Respiratory: normal effort , clear  Gastrointestinal:  Soft, epigastric pain on palpation   Skin: No rashes,  warm to touch  Psychiatry:  Mood & affect appropriate  Musculuskeletal: No edema    recent labs, Imaging and EKGs personally reviewed                           11.8   5.66  )-----------( 241      ( 12 Feb 2023 07:05 )             36.6               02-12    142  |  107  |  11  ----------------------------<  97  4.1   |  21<L>  |  0.93    Ca    8.5      12 Feb 2023 07:04  Phos  3.7     02-12  Mg     1.9     02-12    TPro  6.6  /  Alb  3.1<L>  /  TBili  0.4  /  DBili  x   /  AST  30  /  ALT  36  /  AlkPhos  79  02-12

## 2023-02-13 LAB
D DIMER BLD IA.RAPID-MCNC: 984 NG/ML DDU — HIGH
GLUCOSE BLDC GLUCOMTR-MCNC: 133 MG/DL — HIGH (ref 70–99)
LIDOCAIN IGE QN: 83 U/L — HIGH (ref 7–60)

## 2023-02-13 PROCEDURE — 71275 CT ANGIOGRAPHY CHEST: CPT | Mod: 26

## 2023-02-13 RX ADMIN — HEPARIN SODIUM 5000 UNIT(S): 5000 INJECTION INTRAVENOUS; SUBCUTANEOUS at 18:34

## 2023-02-13 RX ADMIN — MUPIROCIN 1 APPLICATION(S): 20 OINTMENT TOPICAL at 18:34

## 2023-02-13 RX ADMIN — HEPARIN SODIUM 5000 UNIT(S): 5000 INJECTION INTRAVENOUS; SUBCUTANEOUS at 05:03

## 2023-02-13 RX ADMIN — CHLORHEXIDINE GLUCONATE 1 APPLICATION(S): 213 SOLUTION TOPICAL at 12:24

## 2023-02-13 RX ADMIN — MUPIROCIN 1 APPLICATION(S): 20 OINTMENT TOPICAL at 05:02

## 2023-02-13 NOTE — PROGRESS NOTE ADULT - SUBJECTIVE AND OBJECTIVE BOX
Name of Patient : DEBORAH BARTHOLOMEW  MRN: [id number]  Date of visit: 02-13-23 @ 15:25      Subjective: Patient seen and examined. No new events except as noted.   Patient seen earlier this AM. Lying down in bed.  Reports that he is feeling better.   Denies abdominal pain or discomfort    REVIEW OF SYSTEMS:    CONSTITUTIONAL: No weakness, fevers or chills  EYES/ENT: No visual changes;  No vertigo or throat pain   NECK: No pain or stiffness  RESPIRATORY: No cough, wheezing, hemoptysis; No shortness of breath  CARDIOVASCULAR: No chest pain or palpitations  GASTROINTESTINAL: No abdominal or epigastric pain. No nausea, vomiting, or hematemesis; No diarrhea or constipation. No melena or hematochezia.  GENITOURINARY: No dysuria, frequency or hematuria  NEUROLOGICAL: No numbness or weakness  SKIN: No itching, burning, rashes, or lesions   All other review of systems is negative unless indicated above.    MEDICATIONS:  MEDICATIONS  (STANDING):  chlorhexidine 2% Cloths 1 Application(s) Topical daily  heparin   Injectable 5000 Unit(s) SubCutaneous every 12 hours  mupirocin 2% Ointment 1 Application(s) Both Nostrils two times a day  senna 2 Tablet(s) Oral at bedtime      PHYSICAL EXAM:  T(C): 36.8 (02-13-23 @ 11:52), Max: 36.8 (02-13-23 @ 11:52)  HR: 61 (02-13-23 @ 11:52) (61 - 68)  BP: 109/72 (02-13-23 @ 11:52) (109/72 - 131/77)  RR: 16 (02-13-23 @ 11:52) (16 - 18)  SpO2: 94% (02-13-23 @ 11:52) (94% - 96%)  Wt(kg): --  I&O's Summary        Appearance: Normal	  HEENT:  PERRLA   Lymphatic: No lymphadenopathy   Cardiovascular: Normal S1 S2, no JVD  Respiratory: normal effort , clear  Gastrointestinal:  Soft, Non-tender  Skin: No rashes,  warm to touch  Psychiatry:  Mood & affect appropriate  Musculuskeletal: No edema      All labs, Imaging and EKGs personally reviewed                        Name of Patient : DEBORAH BARTHOLOMEW  MRN: [id number]  Date of visit: 02-13-23 @ 15:25      Subjective: Patient seen and examined. No new events except as noted.   Patient seen earlier this AM. Lying down in bed.  Reports that he is feeling better.   Denies abdominal pain or discomfort    REVIEW OF SYSTEMS:    CONSTITUTIONAL: No weakness, fevers or chills  EYES/ENT: No visual changes;  No vertigo or throat pain   NECK: No pain or stiffness  RESPIRATORY: No cough, wheezing, hemoptysis; No shortness of breath  CARDIOVASCULAR: No chest pain or palpitations  GASTROINTESTINAL: No abdominal or epigastric pain. No nausea, vomiting, or hematemesis; No diarrhea or constipation. No melena or hematochezia.  GENITOURINARY: No dysuria, frequency or hematuria  NEUROLOGICAL: No numbness or weakness  SKIN: No itching, burning, rashes, or lesions   All other review of systems is negative unless indicated above.    MEDICATIONS:  MEDICATIONS  (STANDING):  chlorhexidine 2% Cloths 1 Application(s) Topical daily  heparin   Injectable 5000 Unit(s) SubCutaneous every 12 hours  mupirocin 2% Ointment 1 Application(s) Both Nostrils two times a day  senna 2 Tablet(s) Oral at bedtime      PHYSICAL EXAM:  T(C): 36.8 (02-13-23 @ 11:52), Max: 36.8 (02-13-23 @ 11:52)  HR: 61 (02-13-23 @ 11:52) (61 - 68)  BP: 109/72 (02-13-23 @ 11:52) (109/72 - 131/77)  RR: 16 (02-13-23 @ 11:52) (16 - 18)  SpO2: 94% (02-13-23 @ 11:52) (94% - 96%)  Wt(kg): --  I&O's Summary        Appearance: Normal	  HEENT:  PERRL   Lymphatic: No lymphadenopathy   Cardiovascular: Normal S1 S2, no JVD  Respiratory: normal effort , clear  Gastrointestinal:  Soft, Non-tender to palpitation   Skin: No rashes,  warm to touch  Psychiatry:  Mood & affect appropriate  Musculoskeletal: No edema      All labs, Imaging and EKGs personally reviewed                               11.8   5.66  )-----------( 241      ( 12 Feb 2023 07:05 )             36.6               02-12    142  |  107  |  11  ----------------------------<  97  4.1   |  21<L>  |  0.93    Ca    8.5      12 Feb 2023 07:04  Phos  3.7     02-12  Mg     1.9     02-12    TPro  6.6  /  Alb  3.1<L>  /  TBili  0.4  /  DBili  x   /  AST  30  /  ALT  36  /  AlkPhos  79  02-12                         < from: [facility] Duplex Lower Ext Vein Scan, Bilat (02.12.23 @ 17:41) >    IMPRESSION:  No evidence of deep venous thrombosis in either lower extremity.      < end of copied text >

## 2023-02-14 ENCOUNTER — TRANSCRIPTION ENCOUNTER (OUTPATIENT)
Age: 53
End: 2023-02-14

## 2023-02-14 VITALS
HEART RATE: 68 BPM | SYSTOLIC BLOOD PRESSURE: 107 MMHG | RESPIRATION RATE: 18 BRPM | OXYGEN SATURATION: 94 % | DIASTOLIC BLOOD PRESSURE: 67 MMHG | TEMPERATURE: 98 F

## 2023-02-14 PROCEDURE — 36415 COLL VENOUS BLD VENIPUNCTURE: CPT

## 2023-02-14 PROCEDURE — 85025 COMPLETE CBC W/AUTO DIFF WBC: CPT

## 2023-02-14 PROCEDURE — 85610 PROTHROMBIN TIME: CPT

## 2023-02-14 PROCEDURE — 84145 PROCALCITONIN (PCT): CPT

## 2023-02-14 PROCEDURE — 74177 CT ABD & PELVIS W/CONTRAST: CPT | Mod: MD

## 2023-02-14 PROCEDURE — 85027 COMPLETE CBC AUTOMATED: CPT

## 2023-02-14 PROCEDURE — 93970 EXTREMITY STUDY: CPT

## 2023-02-14 PROCEDURE — 80053 COMPREHEN METABOLIC PANEL: CPT

## 2023-02-14 PROCEDURE — 80061 LIPID PANEL: CPT

## 2023-02-14 PROCEDURE — 86140 C-REACTIVE PROTEIN: CPT

## 2023-02-14 PROCEDURE — 85379 FIBRIN DEGRADATION QUANT: CPT

## 2023-02-14 PROCEDURE — 96374 THER/PROPH/DIAG INJ IV PUSH: CPT

## 2023-02-14 PROCEDURE — 71046 X-RAY EXAM CHEST 2 VIEWS: CPT

## 2023-02-14 PROCEDURE — 85730 THROMBOPLASTIN TIME PARTIAL: CPT

## 2023-02-14 PROCEDURE — 76705 ECHO EXAM OF ABDOMEN: CPT

## 2023-02-14 PROCEDURE — 87637 SARSCOV2&INF A&B&RSV AMP PRB: CPT

## 2023-02-14 PROCEDURE — 99285 EMERGENCY DEPT VISIT HI MDM: CPT

## 2023-02-14 PROCEDURE — 82962 GLUCOSE BLOOD TEST: CPT

## 2023-02-14 PROCEDURE — 80307 DRUG TEST PRSMV CHEM ANLYZR: CPT

## 2023-02-14 PROCEDURE — 83615 LACTATE (LD) (LDH) ENZYME: CPT

## 2023-02-14 PROCEDURE — 83690 ASSAY OF LIPASE: CPT

## 2023-02-14 PROCEDURE — 71275 CT ANGIOGRAPHY CHEST: CPT

## 2023-02-14 PROCEDURE — 83735 ASSAY OF MAGNESIUM: CPT

## 2023-02-14 PROCEDURE — 87641 MR-STAPH DNA AMP PROBE: CPT

## 2023-02-14 PROCEDURE — 82150 ASSAY OF AMYLASE: CPT

## 2023-02-14 PROCEDURE — 84478 ASSAY OF TRIGLYCERIDES: CPT

## 2023-02-14 PROCEDURE — 93005 ELECTROCARDIOGRAM TRACING: CPT

## 2023-02-14 PROCEDURE — 87640 STAPH A DNA AMP PROBE: CPT

## 2023-02-14 PROCEDURE — 86900 BLOOD TYPING SEROLOGIC ABO: CPT

## 2023-02-14 PROCEDURE — 86901 BLOOD TYPING SEROLOGIC RH(D): CPT

## 2023-02-14 PROCEDURE — 86850 RBC ANTIBODY SCREEN: CPT

## 2023-02-14 PROCEDURE — 82728 ASSAY OF FERRITIN: CPT

## 2023-02-14 PROCEDURE — 84100 ASSAY OF PHOSPHORUS: CPT

## 2023-02-14 RX ORDER — POLYETHYLENE GLYCOL 3350 17 G/17G
17 POWDER, FOR SOLUTION ORAL
Qty: 340 | Refills: 0
Start: 2023-02-14 | End: 2023-02-23

## 2023-02-14 RX ORDER — LANOLIN ALCOHOL/MO/W.PET/CERES
1 CREAM (GRAM) TOPICAL
Qty: 30 | Refills: 0
Start: 2023-02-14 | End: 2023-03-15

## 2023-02-14 RX ORDER — SENNA PLUS 8.6 MG/1
2 TABLET ORAL
Qty: 60 | Refills: 0
Start: 2023-02-14 | End: 2023-03-15

## 2023-02-14 NOTE — PROGRESS NOTE ADULT - ASSESSMENT
52M no PMH presenting for LLQ abdominal pain x2d associated with nausea, decreased appetite, and constipation (small BM today) admit c/f pancreatitis      Acute pancreatitis.   - Here for LLQ abdominal pain x2d astd w/ nausea, decreased appetite, and constipation  - afebrile VSS, Nontoxic  - CT a/p: Acute pancreatitis. No peripancreatic collection or evidence of necrosis  - cbc & chem wnl, no leukocytosis  - trend lipase elevated   - TG- nl, Ca nl-- pancreatitis 2/2 HyperTG or Hyper Ca less probable   - LFT & bili also norm, gallstone pancreatitis less likely but will check Abm U/S for completeness   - Endorse intermittent etoh use and occasional THC use. Will check drug screen & BAL  - COVID+  - CXR neg   - s/p 2L NS bolus, zofran, morphine in ED  - c/w maintenance IVF   - Supportive care: prn pain med, Zofran  - Advance diet as tolerates -- reports pain with regular diet, placed on FLD, monitor for tolerance  - Surgery eval consulted; F/u recs   - F/u ABD US-- pending      Abnormal INR.   - Abnorm coag, not on AC  - Unclear why coag abnorm   - ?acute process vs COVID vs transient reactive process   - trend labs     2019 novel coronavirus disease (COVID-19).   - COVID+ on lab test  - Afebrile, VSS, Sats > 95% RA, no dyspnea  - No indication for steroids, supportive care   - Monitor resp status  - Airborne precaution per unit protocol.  - No respiratory complaints; monitor patient closely     Constipation   - miralax and senna     PPX  - PPI   - Heparin   
52M no PMH presenting for LLQ abdominal pain x2d associated with nausea, decreased appetite, and constipation (small BM today) admit c/f pancreatitis      Acute pancreatitis.   - Here for LLQ abdominal pain x2d astd w/ nausea, decreased appetite, and constipation  - afebrile VSS, Nontoxic  - CT a/p: Acute pancreatitis. No peripancreatic collection or evidence of necrosis  - cbc & chem wnl, no leukocytosis  - trend lipase elevated   - TG- nl, Ca nl-- pancreatitis 2/2 HyperTG or Hyper Ca less probable   - LFT & bili also norm, gallstone pancreatitis less likely but will check Abm U/S for completeness   - Endorse intermittent etoh use and occasional THC use. Will check drug screen & BAL  - COVID+  - CXR neg   - s/p 2L NS bolus, zofran, morphine in ED  - c/w maintenance IVF   - Supportive care: prn pain med, Zofran  - Advance diet as tolerates -- reports pain with regular diet, placed on FLD, monitor for tolerance  - Surgery eval consulted; F/u recs   - F/u ABD US-- noted       Abnormal INR.   - Abnorm coag, not on AC  - Unclear why coag abnorm   - ?acute process vs COVID vs transient reactive process   - trend labs     2019 novel coronavirus disease (COVID-19).   - COVID+ on lab test  - Afebrile, VSS, Sats > 95% RA, no dyspnea  - No indication for steroids, supportive care   - Monitor resp status  - Airborne precaution per unit protocol.  - No respiratory complaints; monitor patient closely   - elevated D-dimer, check DVT study     Constipation   - miralax and senna     PPX  - PPI   - Heparin   
ASSESSMENT: 52M no PMH presenting to the ED with LLQ abdominal pain for the past 2 days associated with nausea, decreased appetite, and constipation (small BM today). Imaging and labs consistent with acute pancreatitis.    PLAN:    - no acute surgical intervention indicated   - pt afebrile, hemodynamically stable   - US negative for gallstone   - Please re-consult as needed     ACS  [id number]  
52M no PMH presenting for LLQ abdominal pain x2d associated with nausea, decreased appetite, and constipation (small BM today) admit c/f pancreatitis      Acute pancreatitis.   - Here for LLQ abdominal pain x2d astd w/ nausea, decreased appetite, and constipation  - afebrile VSS, Nontoxic  - CT a/p: Acute pancreatitis. No peripancreatic collection or evidence of necrosis  - cbc & chem wnl, no leukocytosis  - lipase elevated (415), trend in AM   - TG- nl, Ca nl-- pancreatitis 2/2 HyperTG or Hyper Ca less probable   - LFT & bili also norm, gallstone pancreatitis less likely but will check Abm U/S for completeness   - Endorse intermittent etoh use and occasional THC use. Will check drug screen & BAL  - COVID+  - CXR neg   - s/p 2L NS bolus, zofran, morphine in ED  - c/w maintenance IVF   - Supportive care: prn pain med, Zofran  - Advance diet as tolerates -- reports pain with regular diet, placed on FLD, monitor for tolerance  - Surgery eval consulted; F/u recs   - F/u ABD US-- pending      Abnormal INR.   - Abnorm coag, not on AC  - Unclear why coag abnorm   - ?acute process vs COVID vs transient reactive process   - trend labs     2019 novel coronavirus disease (COVID-19).   - COVID+ on lab test  - Afebrile, VSS, Sats > 95% RA, no dyspnea  - No indication for steroids, supportive care   - Monitor resp status  - Airborne precaution per unit protocol.  - No respiratory complaints; monitor patient closely     Constipation   - miralax and senna     PPX  - PPI   - Heparin   
52M no PMH presenting for LLQ abdominal pain x2d associated with nausea, decreased appetite, and constipation (small BM today) admit c/f pancreatitis      Acute pancreatitis.   - Here for LLQ abdominal pain x2d astd w/ nausea, decreased appetite, and constipation  - afebrile VSS, Nontoxic  - CT a/p: Acute pancreatitis. No peripancreatic collection or evidence of necrosis  - cbc & chem wnl, no leukocytosis  - trend lipase, down-trending    - TG- nl, Ca nl-- pancreatitis 2/2 HyperTG or Hyper Ca less probable   - LFT & bili also norm, gallstone pancreatitis less likely but will check Abm U/S for completeness   - Endorse intermittent etoh use and occasional THC use. Will check drug screen & BAL  - COVID+  - CXR neg   - s/p 2L NS bolus, zofran, morphine in ED  - c/w maintenance IVF   - Supportive care: prn pain med, Zofran  - Advance diet as tolerates -- reports tolerating current diet   - Surgery eval consulted; F/u recs   - F/u ABD US-- noted   - ABD pain improving. Diet advanced. Monitor for tolerance     Abnormal INR.   - Abnorm coag, not on AC  - Unclear why coag abnorm   - ?acute process vs COVID vs transient reactive process   - trend labs     2019 novel coronavirus disease (COVID-19).   - COVID+ on lab test  - Afebrile, VSS, Sats > 95% RA, no dyspnea  - No indication for steroids, supportive care   - Monitor resp status  - Airborne precaution per unit protocol.  - No respiratory complaints; monitor patient closely   - elevated D-dimer, check DVT study  -- neg  - Check CTA Chest to r/o PE -- Neg     Thyroid nodule   - CTA with thyroid nodules  - CTA results were reviewed with patient and his daughter Diane at the patient's bedside via telephone 02/14. NS pacific operator offered, however, patient and daughter opted for daughter as . Informed patient and daughter of thyropid nodules and to follow up with endocrinology as an outpatient for continued work up/ management. Emmanuelther states that she will assist in getting patient to outpatient Endo within 2 weeks of discharge. Have asked DC team to provide patient with Endo information.       Constipation   - miralax and senna   - Reports having BM 02/14    PPX  - PPI   - Heparin   
52M no PMH presenting for LLQ abdominal pain x2d associated with nausea, decreased appetite, and constipation (small BM today) admit c/f pancreatitis      Acute pancreatitis.   - Here for LLQ abdominal pain x2d astd w/ nausea, decreased appetite, and constipation  - afebrile VSS, Nontoxic  - CT a/p: Acute pancreatitis. No peripancreatic collection or evidence of necrosis  - cbc & chem wnl, no leukocytosis  - trend lipase elevated   - TG- nl, Ca nl-- pancreatitis 2/2 HyperTG or Hyper Ca less probable   - LFT & bili also norm, gallstone pancreatitis less likely but will check Abm U/S for completeness   - Endorse intermittent etoh use and occasional THC use. Will check drug screen & BAL  - COVID+  - CXR neg   - s/p 2L NS bolus, zofran, morphine in ED  - c/w maintenance IVF   - Supportive care: prn pain med, Zofran  - Advance diet as tolerates -- reports pain with regular diet, placed on FLD, monitor for tolerance  - Surgery eval consulted; F/u recs   - F/u ABD US-- noted   - ABD pain improving. Diet advanced. Monitor for tolerance     Abnormal INR.   - Abnorm coag, not on AC  - Unclear why coag abnorm   - ?acute process vs COVID vs transient reactive process   - trend labs     2019 novel coronavirus disease (COVID-19).   - COVID+ on lab test  - Afebrile, VSS, Sats > 95% RA, no dyspnea  - No indication for steroids, supportive care   - Monitor resp status  - Airborne precaution per unit protocol.  - No respiratory complaints; monitor patient closely   - elevated D-dimer, check DVT study  -- neg  - Check CTA Chest to r/o PE ; F/u results      Constipation   - miralax and senna     PPX  - PPI   - Heparin

## 2023-02-14 NOTE — PROGRESS NOTE ADULT - PROVIDER SPECIALTY LIST ADULT
Internal Medicine
Trauma Surgery

## 2023-02-14 NOTE — DISCHARGE NOTE NURSING/CASE MANAGEMENT/SOCIAL WORK - PATIENT PORTAL LINK FT
You can access the FollowMyHealth Patient Portal offered by [facility] by registering at the following website: [email or web address]. By joining [employer]’s FollowMyHealth portal, you will also be able to view your health information using other applications (apps) compatible with our system.

## 2023-02-14 NOTE — PROGRESS NOTE ADULT - SUBJECTIVE AND OBJECTIVE BOX
Name of Patient : DEBORAH BARTHOLOMEW  MRN: [id number]  Date of visit: 02-14-23 @ 14:23      Subjective: Patient seen and examined. No new events except as noted.   Patient seen earlier this AM. Ambulating around room.  Daughter providing translation via telephone.   Patient reports feeling better. Denies abdominal pain or discomfort. States that he had a BM  CTA neg for PE    REVIEW OF SYSTEMS:    CONSTITUTIONAL: No weakness, fevers or chills  EYES/ENT: No visual changes;  No vertigo or throat pain   NECK: No pain or stiffness  RESPIRATORY: No cough, wheezing, hemoptysis; No shortness of breath  CARDIOVASCULAR: No chest pain or palpitations  GASTROINTESTINAL: No abdominal or epigastric pain. No nausea, vomiting, or hematemesis; No diarrhea or constipation. No melena or hematochezia.  GENITOURINARY: No dysuria, frequency or hematuria  NEUROLOGICAL: No numbness or weakness  SKIN: No itching, burning, rashes, or lesions   All other review of systems is negative unless indicated above.    MEDICATIONS:  MEDICATIONS  (STANDING):  chlorhexidine 2% Cloths 1 Application(s) Topical daily  heparin   Injectable 5000 Unit(s) SubCutaneous every 12 hours  mupirocin 2% Ointment 1 Application(s) Both Nostrils two times a day  senna 2 Tablet(s) Oral at bedtime      PHYSICAL EXAM:  T(C): 36.9 (02-14-23 @ 13:15), Max: 36.9 (02-14-23 @ 13:15)  HR: 68 (02-14-23 @ 13:15) (62 - 68)  BP: 107/67 (02-14-23 @ 13:15) (98/56 - 113/71)  RR: 18 (02-14-23 @ 13:15) (16 - 18)  SpO2: 94% (02-14-23 @ 13:15) (93% - 95%)  Wt(kg): --  I&O's Summary        Appearance: Normal	  HEENT: Eyes are open   Lymphatic: No lymphadenopathy   Cardiovascular: Normal S1 S2, no JVD  Respiratory: normal effort , clear  Gastrointestinal:  Soft, Non-tender  Skin: No rashes,  warm to touch  Psychiatry:  Mood & affect appropriate  Musculoskeletal: No edema        < from: CT Angio Chest PE Protocol w/ IV Cont (02.13.23 @ 19:13) >    ACC: [id number] EXAM:  CT ANGIO CHEST PULM [facility]   ORDERED BY:  STEFANIE TORRES     PROCEDURE DATE:  02/13/2023          INTERPRETATION:  CTA CHEST    INDICATION: Elevated d-dimer. History of Covid 19 and pancreatitis.   Evaluate for pulmonary embolus.    TECHNIQUE: Enhanced helical images were obtained of the chest. Coronal   and sagittal images were reconstructed.  Images were obtained after the   uneventful administration of 70 cc of nonionic intravenous contrast   (Omnipaque 350). 30 cc of nonionic intravenous contrast (Omnipaque 350)   was discarded. Maximum intensity projection images were generated.    COMPARISON: Radiograph chest 2/9/2023. CT abdomen 2/9/2023    FINDINGS:    Pulmonary Artery:  There is no main, lobar, segmental, or subsegmental   pulmonary embolus.    Tubes/Lines: None.    Lungs, airways and pleura: Bilateral lower lobe, right middle lobe, and   left upper lobe linear atelectasis.    No pleural effusion or pneumothorax. The trachea is displaced the left of   midline at the thoracic inlet. The airways are otherwise normal.    Mediastinum: The thyroid gland is heterogeneous contains bilobar nodules.   The right lobe of the gland is enlarged and contains a hypodense and   peripherally calcified 4.0 x 3.4 cm nodule which displaces the trachea   just to the left of midline. The esophagus is normal.    The heart is normal in size. Left-sided aortic arch and left-sided   descending thoracic aorta. The aorta is normal in caliber.    Upper Abdomen: Peripancreaticinflammation is unchanged and better shown   on 2/9/2023.    Bones And Soft Tissues: The bones are unremarkable.  The soft tissues are   unremarkable.      IMPRESSION:    1.  No pulmonary embolus.  2.  Bilateral linear atelectasis.  3.  Partially imaged acute pancreatitis, better shown on 2/9/2023.    < end of copied text >

## 2023-02-20 LAB
AMPHETAMINES UR QL SCN: NEGATIVE NG/ML — SIGNIFICANT CHANGE UP
BARBITURATES UR QL SCN: NEGATIVE NG/ML — SIGNIFICANT CHANGE UP
BARBITURATES UR-MCNC: NEGATIVE NG/ML — SIGNIFICANT CHANGE UP
BENZODIAZ UR QL: NEGATIVE NG/ML — SIGNIFICANT CHANGE UP
BZE UR QL: NEGATIVE NG/ML — SIGNIFICANT CHANGE UP
CANNABINOIDS UR QL SCN: NEGATIVE NG/ML — SIGNIFICANT CHANGE UP
ETHANOL UR-MCNC: NEGATIVE % — SIGNIFICANT CHANGE UP
METHADONE UR QL SCN: NEGATIVE NG/ML — SIGNIFICANT CHANGE UP
METHAQUALONE UR QL: NEGATIVE NG/ML — SIGNIFICANT CHANGE UP
METHAQUALONE UR-MCNC: NEGATIVE NG/ML — SIGNIFICANT CHANGE UP
OPIATES UR QL: SIGNIFICANT CHANGE UP NG/ML
PCP UR QL: NEGATIVE NG/ML — SIGNIFICANT CHANGE UP
PROPOXYPH UR QL: NEGATIVE NG/ML — SIGNIFICANT CHANGE UP

## 2023-03-13 ENCOUNTER — EMERGENCY (EMERGENCY)
Facility: HOSPITAL | Age: 53
LOS: 1 days | Discharge: HOME | End: 2023-03-13
Attending: STUDENT IN AN ORGANIZED HEALTH CARE EDUCATION/TRAINING PROGRAM
Payer: MEDICAID

## 2023-03-13 VITALS
SYSTOLIC BLOOD PRESSURE: 133 MMHG | HEART RATE: 78 BPM | TEMPERATURE: 98 F | WEIGHT: 149.91 LBS | RESPIRATION RATE: 20 BRPM | DIASTOLIC BLOOD PRESSURE: 74 MMHG | HEIGHT: 66 IN

## 2023-03-13 LAB
ALBUMIN SERPL ELPH-MCNC: 4.1 G/DL — SIGNIFICANT CHANGE UP (ref 3.3–5)
ALP SERPL-CCNC: 107 U/L — SIGNIFICANT CHANGE UP (ref 40–120)
ALT FLD-CCNC: 36 U/L — SIGNIFICANT CHANGE UP (ref 10–45)
ANION GAP SERPL CALC-SCNC: 13 MMOL/L — SIGNIFICANT CHANGE UP (ref 5–17)
APPEARANCE UR: CLEAR — SIGNIFICANT CHANGE UP
AST SERPL-CCNC: 41 U/L — HIGH (ref 10–40)
BASOPHILS # BLD AUTO: 0.06 K/UL — SIGNIFICANT CHANGE UP (ref 0–0.2)
BASOPHILS NFR BLD AUTO: 0.6 % — SIGNIFICANT CHANGE UP (ref 0–2)
BILIRUB SERPL-MCNC: 0.3 MG/DL — SIGNIFICANT CHANGE UP (ref 0.2–1.2)
BILIRUB UR-MCNC: NEGATIVE — SIGNIFICANT CHANGE UP
BUN SERPL-MCNC: 24 MG/DL — HIGH (ref 7–23)
CALCIUM SERPL-MCNC: 9.1 MG/DL — SIGNIFICANT CHANGE UP (ref 8.4–10.5)
CHLORIDE SERPL-SCNC: 105 MMOL/L — SIGNIFICANT CHANGE UP (ref 96–108)
CO2 SERPL-SCNC: 23 MMOL/L — SIGNIFICANT CHANGE UP (ref 22–31)
COLOR SPEC: SIGNIFICANT CHANGE UP
CREAT SERPL-MCNC: 1.31 MG/DL — HIGH (ref 0.5–1.3)
DIFF PNL FLD: NEGATIVE — SIGNIFICANT CHANGE UP
EGFR: 65 ML/MIN/1.73M2 — SIGNIFICANT CHANGE UP
EOSINOPHIL # BLD AUTO: 0.13 K/UL — SIGNIFICANT CHANGE UP (ref 0–0.5)
EOSINOPHIL NFR BLD AUTO: 1.2 % — SIGNIFICANT CHANGE UP (ref 0–6)
GLUCOSE SERPL-MCNC: 107 MG/DL — HIGH (ref 70–99)
GLUCOSE UR QL: NEGATIVE — SIGNIFICANT CHANGE UP
HCT VFR BLD CALC: 43.5 % — SIGNIFICANT CHANGE UP (ref 39–50)
HGB BLD-MCNC: 13.6 G/DL — SIGNIFICANT CHANGE UP (ref 13–17)
IMM GRANULOCYTES NFR BLD AUTO: 0.4 % — SIGNIFICANT CHANGE UP (ref 0–0.9)
KETONES UR-MCNC: NEGATIVE — SIGNIFICANT CHANGE UP
LEUKOCYTE ESTERASE UR-ACNC: NEGATIVE — SIGNIFICANT CHANGE UP
LIDOCAIN IGE QN: 62 U/L — HIGH (ref 7–60)
LYMPHOCYTES # BLD AUTO: 0.81 K/UL — LOW (ref 1–3.3)
LYMPHOCYTES # BLD AUTO: 7.7 % — LOW (ref 13–44)
MCHC RBC-ENTMCNC: 27 PG — SIGNIFICANT CHANGE UP (ref 27–34)
MCHC RBC-ENTMCNC: 31.3 GM/DL — LOW (ref 32–36)
MCV RBC AUTO: 86.5 FL — SIGNIFICANT CHANGE UP (ref 80–100)
MONOCYTES # BLD AUTO: 0.43 K/UL — SIGNIFICANT CHANGE UP (ref 0–0.9)
MONOCYTES NFR BLD AUTO: 4.1 % — SIGNIFICANT CHANGE UP (ref 2–14)
NEUTROPHILS # BLD AUTO: 8.99 K/UL — HIGH (ref 1.8–7.4)
NEUTROPHILS NFR BLD AUTO: 86 % — HIGH (ref 43–77)
NITRITE UR-MCNC: NEGATIVE — SIGNIFICANT CHANGE UP
NRBC # BLD: 0 /100 WBCS — SIGNIFICANT CHANGE UP (ref 0–0)
PH UR: 6 — SIGNIFICANT CHANGE UP (ref 5–8)
PLATELET # BLD AUTO: 219 K/UL — SIGNIFICANT CHANGE UP (ref 150–400)
POTASSIUM SERPL-MCNC: 4.3 MMOL/L — SIGNIFICANT CHANGE UP (ref 3.5–5.3)
POTASSIUM SERPL-SCNC: 4.3 MMOL/L — SIGNIFICANT CHANGE UP (ref 3.5–5.3)
PROT SERPL-MCNC: 7.4 G/DL — SIGNIFICANT CHANGE UP (ref 6–8.3)
PROT UR-MCNC: SIGNIFICANT CHANGE UP
RBC # BLD: 5.03 M/UL — SIGNIFICANT CHANGE UP (ref 4.2–5.8)
RBC # FLD: 13.3 % — SIGNIFICANT CHANGE UP (ref 10.3–14.5)
SODIUM SERPL-SCNC: 141 MMOL/L — SIGNIFICANT CHANGE UP (ref 135–145)
SP GR SPEC: 1.02 — SIGNIFICANT CHANGE UP (ref 1.01–1.02)
UROBILINOGEN FLD QL: NEGATIVE — SIGNIFICANT CHANGE UP
WBC # BLD: 10.46 K/UL — SIGNIFICANT CHANGE UP (ref 3.8–10.5)
WBC # FLD AUTO: 10.46 K/UL — SIGNIFICANT CHANGE UP (ref 3.8–10.5)

## 2023-03-13 PROCEDURE — 99283 EMERGENCY DEPT VISIT LOW MDM: CPT

## 2023-03-13 PROCEDURE — 99285 EMERGENCY DEPT VISIT HI MDM: CPT

## 2023-03-13 RX ORDER — KETOROLAC TROMETHAMINE 30 MG/ML
15 SYRINGE (ML) INJECTION ONCE
Refills: 0 | Status: DISCONTINUED | OUTPATIENT
Start: 2023-03-13 | End: 2023-03-13

## 2023-03-13 RX ORDER — SODIUM CHLORIDE 9 MG/ML
1000 INJECTION INTRAMUSCULAR; INTRAVENOUS; SUBCUTANEOUS ONCE
Refills: 0 | Status: DISCONTINUED | OUTPATIENT
Start: 2023-03-13 | End: 2023-03-13

## 2023-03-13 RX ORDER — SODIUM CHLORIDE 9 MG/ML
1000 INJECTION, SOLUTION INTRAVENOUS ONCE
Refills: 0 | Status: DISCONTINUED | OUTPATIENT
Start: 2023-03-13 | End: 2023-03-13

## 2023-03-13 RX ORDER — SODIUM CHLORIDE 9 MG/ML
1000 INJECTION, SOLUTION INTRAVENOUS ONCE
Refills: 0 | Status: COMPLETED | OUTPATIENT
Start: 2023-03-13 | End: 2023-03-13

## 2023-03-13 RX ORDER — ACETAMINOPHEN 500 MG
975 TABLET ORAL ONCE
Refills: 0 | Status: COMPLETED | OUTPATIENT
Start: 2023-03-13 | End: 2023-03-13

## 2023-03-13 RX ADMIN — Medication 975 MILLIGRAM(S): at 22:13

## 2023-03-13 RX ADMIN — Medication 15 MILLIGRAM(S): at 22:14

## 2023-03-13 RX ADMIN — SODIUM CHLORIDE 1000 MILLILITER(S): 9 INJECTION, SOLUTION INTRAVENOUS at 22:58

## 2023-03-13 NOTE — ED PROVIDER NOTE - PATIENT PORTAL LINK FT
You can access the FollowMyHealth Patient Portal offered by [facility] by registering at the following website: [email or web address]. By joining [facility]’s FollowMyHealth portal, you will also be able to view your health information using other applications (apps) compatible with our system.

## 2023-03-13 NOTE — CONSULT NOTE ADULT - SUBJECTIVE AND OBJECTIVE BOX
HPI:  52y Male with PMHx HSV, recently admitted for acute pancreatitis last month, penile aesthetic injection for penile enlargement 17 years ago, active syphilis & mycoplasma genitalium dx 1 week ago, presents to ED with penile swelling x 2months, worsened 1 day ago. Pt reports that he was sexually active with a male and female 2 months ago without protection and developed penile swelling shortly after. Pt states he noticed extreme worsening in swelling and pain yesterday with hematuria, blood at tip of penis after voids and dysuria. With increased swelling, pt has had difficulty retracting his foreskin as well. Did not take anything at home for pain. Denies history of similar symptoms. Also has a diffuse rash on his body for about 1 week from the syphilis. Pt states he saw a urologist in [place] 2 weeks ago who sent STD panel and sent patient for MRI. MRI impression reads diffuse enlargement of penis involving entire penile shaft involving the superficial fascia suspicious for penile malignancy. Pt states he got a PCN injection today for syphilis at a clinic in [facility]. Denies fever/chills, nausea/vomiting. Pt denies history of urologic problems in the past.    used: [id number]    In ED: pt is AVSS, white count 10.46, Cr 1.31 (0.93 last month), UA neg. CT scan today shows no hydronephrosis. Tylenol and toradol given, patient endorsing 9/10 pain.     PAST MEDICAL & SURGICAL HISTORY:  Psoriasis      No significant past surgical history          FAMILY HISTORY:  No known  malignancy     Social History:  Denies alcohol and drug abuse, nonsmoker     MEDICATIONS  (STANDING):    MEDICATIONS  (PRN):    Allergies    No Known Allergies    Intolerances      REVIEW OF SYSTEMS: Pertinent positives and negatives as stated in HPI, otherwise negative    Vital signs  T(C): 36.5 (23 @ 19:15), Max: 36.5 (23 @ 19:15)  HR: 78 (23 @ 19:15)  BP: 133/74 (23 @ 19:15)  SpO2: --  Wt(kg): --    Physical Exam  Gen: NAD  HEENT: normocephalic, atraumatic, no scleral icterus  Pulm: CTA b/l, No respiratory distress, no subcostal retractions, no accessory muscle use   CV: S1 S2, RRR,  no JVD  Abd: Soft, NT, ND, no rebound tenderness or guarding  : Uncircumcised. No discharge or blood at urethral meatus. + severe diffuse penile swelling with palpable firm tender nodules at base of penis and around shaft. Able to retract foreskin with some discomfort. Testes descended bilaterally.  Testes and epididymis nontender bilaterally.    MSK:  No CVAT, Moving all extremities, full ROM in all extremities, No edema present  NEURO: A&Ox3, no focal neurological deficits, CN 2-12 grossly intact  SKIN: warm, dry, no rash.    LABS:         @ 20:28    WBC 10.46 / Hct 43.5  / SCr 1.31         141  |  105  |  24<H>  ----------------------------<  107<H>  4.3   |  23  |  1.31<H>    Ca    9.1      13 Mar 2023 20:28    TPro  7.4  /  Alb  4.1  /  TBili  0.3  /  DBili  x   /  AST  41<H>  /  ALT  36  /  AlkPhos  107        Urinalysis Basic - ( 13 Mar 2023 22:26 )    Color: Light Yellow / Appearance: Clear / S.025 / pH: x  Gluc: x / Ketone: Negative  / Bili: Negative / Urobili: Negative   Blood: x / Protein: Trace / Nitrite: Negative   Leuk Esterase: Negative / RBC: x / WBC x   Sq Epi: x / Non Sq Epi: x / Bacteria: x        Urine Cx: pending     Radiology: CT A/P read pending

## 2023-03-13 NOTE — ED ADULT NURSE NOTE - BREATHING, MLM
Spontaneous, unlabored and symmetrical Clofazimine Pregnancy And Lactation Text: This medication is Pregnancy Category C and isn't considered safe during pregnancy. It is excreted in breast milk.

## 2023-03-13 NOTE — ED ADULT NURSE NOTE - OBJECTIVE STATEMENT
pt is a 53yo male presenting to the ED complaining of hematuria. pt states he has been experiencing pt is a 53yo male presenting to the ED complaining of hematuria. MD Dixon at bedside to translate for pt (Azeri), pt states he has been experiencing penile inflammation and pain for the past 2 months, pt states he had an injection into the penis around 3 years ago for enlargement purposes, states he thinks that has something to do with the pain. pt endorsing painful urination and hematuria since last night, pt started on penicillin abx following positive syphillis screen at outpatient STD clinic today. pt recently DCd from hospital for acute pancreatitis, states he has been experiencing some intermittent right sided flank pain since DC. pt A&Ox3 gross neuro intact, no difficulty speaking in complete sentences, s1s2 heart sounds heard, pulses x 4, brown x4, abdomen soft nontender nondistended, skin intact. pt denies chest pain, sob, ha, n/v/d, abdominal pain, f/c.

## 2023-03-13 NOTE — ED ADULT NURSE NOTE - SUICIDE SCREENING QUESTION 2
"Yuli Johnson is a 22 y.o. female who presents to ED with complaints of fever and vaginal discharge since having unprotected sex. She denies abdominal pain, nausea, vomiting, diarrhea, or constipation. Reports new onset of cold sores and rash around her mouth as well. On admission to ED, she is afebrile and hemodynamically stable. No leukocytosis on labs. She currently denies any abdominal symptoms. CT scan was read as "Appendix is well visualized.  There is minimal soft tissue thickening at the extreme tip of the appendix.  I do not think this represents appendicitis."  General surgery was consulted for appendicitis.   " No

## 2023-03-13 NOTE — ED ADULT NURSE REASSESSMENT NOTE - NS ED NURSE REASSESS COMMENT FT1
patient bladder scanned immediately following urination. post-void residual of 38mL. MD Craig made aware

## 2023-03-13 NOTE — CONSULT NOTE ADULT - ASSESSMENT
52y Male with PMHx HSV, recently admitted for acute pancreatitis last month, penile aesthetic injection for penile enlargement 17 years ago, active syphilis & mycoplasma genitalium dx 1 week ago, presents to ED with penile swelling x 2months, worsened 1 day ago. In ED, pt is AVSS, white count 10.46, Cr 1.31 (0.93 last month), UA neg. CT scan today shows no hydronephrosis. Tylenol and toradol given, patient endorsing 9/10 pain.     -pain control  -PVR after next void    52y Male with PMHx HSV, recently admitted for acute pancreatitis last month, penile aesthetic injection for penile enlargement 17 years ago, active syphilis & mycoplasma genitalium dx 1 week ago, presents to ED with penile swelling x 2months, worsened 1 day ago. In ED, pt is AVSS, white count 10.46, Cr 1.31 (0.93 last month), UA neg. CT scan today shows no hydronephrosis. Tylenol and toradol given, patient endorsing 9/10 pain.     -pain control, if unable to be pain controlled, recommend CDU  -PVR after next void  -If PVR >200, patient should have a kang catheter placement and follow up for TOV  -Patient should schedule an appointment with Dr. Darling this week and bring a CD from his MRI   -Patient should follow up with PCP for treatment for syphilis and mycoplasma genitalium     [facility]  [address] [place] [address], [place] [address]  [phone]      case discussed with Dr. Branham

## 2023-03-13 NOTE — ED ADULT TRIAGE NOTE - CHIEF COMPLAINT QUOTE
hematuria, painful urination since last night. swelling.  Positive for Syphilis, on penicillin treatment hematuria, painful urination since last night, genital swelling.  Positive for Syphilis, on penicillin treatment

## 2023-03-13 NOTE — ED PROVIDER NOTE - CARE PROVIDER_API CALL
Edgardo Darling)  Urology  [address] [place] [address], [place] [address]  Phone: [phone]  Fax: [phone]  Follow Up Time: 4-6 Days

## 2023-03-13 NOTE — ED ADULT NURSE NOTE - CHIEF COMPLAINT QUOTE
hematuria, painful urination since last night, genital swelling.  Positive for Syphilis, on penicillin treatment

## 2023-03-13 NOTE — ED PROVIDER NOTE - NSFOLLOWUPINSTRUCTIONS_ED_ALL_ED_FT
Por favor ken seguimiento con el urologo Dr. Edgardo Darling dentro 1-5 moreau. Trate de conseguir suze copia del MRI en forma de disco para llevar al urologo. Va a necesitar suze biopsia. Hable sobre esto con [facility]    [place] tylenol y advil para el dolor. [place] el medicamento oxycodone para dolor que no resuelve con tylenol o advil. Brie medicamento fue mandado a [place] farmacia.     Por favor vuelva imediatamente si tiene fiebres, no puede orinar, o si tiene empeoramiento

## 2023-03-13 NOTE — ED PROVIDER NOTE - ATTENDING CONTRIBUTION TO CARE
I, Saman Muniz, performed a history and physical exam of the patient and discussed their management with the resident and /or advanced care provider. I reviewed the resident and /or ACP's note and agree with the documented findings and plan of care. I was present and available for all procedures.

## 2023-03-13 NOTE — ED PROVIDER NOTE - NSPTACCESSSVCSAPPTDETAILS_ED_ALL_ED_FT
Please help pt make appt with Dr. Edgardo Darling from urology within 1-5 days for work up of possible penile cancer. Pt has concerns about his insurance covering this. If not covered, patient may need another referral. Pt is Dominican Speaking

## 2023-03-13 NOTE — ED PROVIDER NOTE - OBJECTIVE STATEMENT
52y M with PMH of syphilis, HSV, and mycoplasma genitalium, presenting with 2mo of penile pain and swelling, worsening over the last 1d. Pt reports that 2m ago, he developed swelling of the penile shaft. It has progressively gotten more swollen and more painful. He has had difficulty retracting his foreskin to urinate. Has had dysuria. He also reports that he had an episode of hematuria yesterday and that his penis has become more swollen over the last 24 hours. Of note, pt was at an infectious disease clinic earlier today to receive IM PCN for syphilis. Pt was also recently diagnosed with mycoplasma genitalium and took a 4d course of azithromycin. 17y ago pt also had injections placed into penis for penile enlargement by an . Pt has been following outpt with a urologist, had an MRI done 2w ago, which showed concern for potential penile cancer, with recommendation for biopsy. Pt also was hospitalized last month at [facility] for pancreatitis. Pt denies any diarrhea, nausea, vomiting, abnormal penile discharge, testicular pain.   Pt reports that he has unprotected sex. Has not been sexually active since December due to symptoms.

## 2023-03-13 NOTE — ED PROVIDER NOTE - RAPID ASSESSMENT
52M w/ PMHx of pancreatitis and admitted for x3 days hematuria a/w dysuria onset yesterday. Pt has swelling and nausea. Pt went to ED for pancreatitis 3 weeks ago and states consistent symptoms since discharged. Pt took 1 dose of penicillin of syphilis and thinks that is what contributed to his Sx. Pt denies F/V, history of UTI and other external bleeding.    Patient was rapidly assessed via a rapid medical evaluation and/or role of Quick Triage Doctor; a limited history, physical exam and assessment was performed. The patient will be seen and further evaluated in the main emergency department. The remainder of care and evaluation will be conducted by the primary emergency medicine team. Receiving team will follow up on labs, imaging and serially reassess patient as indicated. All further decisions regarding patient care, evaluation and disposition are at the discretion of the receiving primary emergency department team. Seen by Dom Giles (MD) and Kaitlyn MiguelScribe). 52M w/ PMHx of pancreatitis and admitted for x3 days hematuria a/w dysuria onset yesterday. Pt has swelling and nausea. Pt went to ED for pancreatitis 3 weeks ago and states consistent symptoms since discharged. Pt took 1 dose of penicillin of syphilis and thinks that is what contributed to his Sx. Pt denies F/V, history of UTI and other external bleeding.    Patient was rapidly assessed via a rapid medical evaluation and/or role of Quick Triage Doctor; a limited history, physical exam and assessment was performed. The patient will be seen and further evaluated in the main emergency department. The remainder of care and evaluation will be conducted by the primary emergency medicine team. Receiving team will follow up on labs, imaging and serially reassess patient as indicated. All further decisions regarding patient care, evaluation and disposition are at the discretion of the receiving primary emergency department team. Seen by Dom Giles (MD) and Kaitlyn Miguel).      I saw the patient waiting area; a brief history was taken and a physical exam was not performed as there is no physical exam room available.  A more detailed history and physical examination will be performed by the medical providers in the main Emergency Department and the patient will have further work up in the [facility] Emergency Department.  The Patient's care will be completed by the main emergency department team.  I was not involved in this patient's care after the QDOC process, and unless otherwise noted in the ED provider note, was not involved in their care during their ED course.  -- SUDHAKAR

## 2023-03-13 NOTE — ED ADULT NURSE REASSESSMENT NOTE - NS ED NURSE REASSESS COMMENT FT1
upon assessment, pt has systemic blotchy, red rash across abdomen, back, b/l forearms, b/l legs, pt states he has been experiencing this rash since 2/10/23. upon inspection of pt penis, penis appears grossly swollen, about 10cm in diameter, tender to palpation, no discharge noted to urethral opening at head of penis, glans of normal color for pt ethnicity, pt scrotum appears of normal size, nontender to palpation. MD Muniz and MD Dixon also at bedside assessing pt.

## 2023-03-13 NOTE — ED PROVIDER NOTE - PROGRESS NOTE DETAILS
Ángela Dixon, PGY-1: Pt seen by urology. Recs appreciated.   Reports improvement of pain after tylenol and toradol. Declines morphine.    Will do repeat CMP to assess for improvement of KRYSTIN after IVF.

## 2023-03-13 NOTE — ED PROVIDER NOTE - PHYSICAL EXAMINATION
VITALS:   T(C): 37 (03-14-23 @ 03:55), Max: 37.1 (03-13-23 @ 23:47)  HR: 63 (03-14-23 @ 03:55) (63 - 78)  BP: 96/66 (03-14-23 @ 03:55) (96/66 - 133/74)  RR: 18 (03-14-23 @ 03:55) (17 - 20)  SpO2: 98% (03-14-23 @ 03:55) (96% - 98%)    GENERAL: NAD, lying in bed comfortably  HEAD:  Atraumatic, Normocephalic  EYES: EOMI, conjunctiva and sclera clear  ENT: Moist mucous membranes  NECK: Supple, No JVD  CHEST/LUNG: Clear to auscultation bilaterally; No rales, rhonchi, wheezing, or rubs. Unlabored respirations  HEART: Regular rate and rhythm; No murmurs, rubs, or gallops  ABDOMEN: Soft, nontender, nondistended  : Chaperoned by Dr. Saman Muniz. Swollen penile shaft, 10cm across. +firm, tender nodule on R aspect of shaft. Difficult to retract foreskin 2/2 swelling. No discharge/blood at penile tip. No scrotal swelling or edema.  EXTREMITIES:  No clubbing, cyanosis, or edema  NERVOUS SYSTEM:  no focal deficits   SKIN: Diffuse rash on abdomen, back, extremities. No rashes on palms/soles.

## 2023-03-13 NOTE — ED ADULT NURSE NOTE - CAS ELECT INFOMATION PROVIDED
discharge teaching by ED MD Dixon in Palestinian. discharge papers given with instructions in Palestinian./DC instructions

## 2023-03-13 NOTE — ED PROVIDER NOTE - CLINICAL SUMMARY MEDICAL DECISION MAKING FREE TEXT BOX
52y M with PMH of syphilis, HSV, and mycoplasma genitalium, presenting with 2mo of penile pain and swelling, worsening over the last 1d. +dysuria, hematuria    VS WNL. PE shows swollen penile shaft, 10cm across. +firm, tender nodule on R aspect of shaft. Difficult to retract foreskin 2/2 swelling. No discharge/blood at penile tip. No scrotal swelling or edema. No abdominal TTP. Diffuse erythematous rash on abdomen, torso, and extremities. Concern for possible penile cancer vs abscess. Dysuria possibly 2/2 known active STDs vs UTI. Plan: cbc. cmp, ua/uc, fluid bolus, ct abdomen/pelvis 52y M with PMH of syphilis, HSV, and mycoplasma genitalium, presenting with 2mo of penile pain and swelling, worsening over the last 1d. +dysuria, hematuria    VS WNL. PE shows swollen penile shaft, 10cm across. +firm, tender nodule on R aspect of shaft. Difficult to retract foreskin 2/2 swelling. No discharge/blood at penile tip. No scrotal swelling or edema. No abdominal TTP. Diffuse erythematous rash on abdomen, torso, and extremities. Concern for possible penile cancer vs abscess. Dysuria possibly 2/2 known active STDs vs UTI. Plan: cbc. cmp, ua/uc, fluid bolus, ct abdomen/pelvis    Saman Muniz MD: I agree with the above statement and will obtain urology consult.

## 2023-03-14 VITALS
SYSTOLIC BLOOD PRESSURE: 96 MMHG | RESPIRATION RATE: 18 BRPM | DIASTOLIC BLOOD PRESSURE: 66 MMHG | HEART RATE: 63 BPM | OXYGEN SATURATION: 98 % | TEMPERATURE: 99 F

## 2023-03-14 PROBLEM — Z00.00 ENCOUNTER FOR PREVENTIVE HEALTH EXAMINATION: Status: ACTIVE | Noted: 2023-03-14

## 2023-03-14 LAB
ALBUMIN SERPL ELPH-MCNC: 3.4 G/DL — SIGNIFICANT CHANGE UP (ref 3.3–5)
ALP SERPL-CCNC: 101 U/L — SIGNIFICANT CHANGE UP (ref 40–120)
ALT FLD-CCNC: 80 U/L — HIGH (ref 10–45)
ANION GAP SERPL CALC-SCNC: 8 MMOL/L — SIGNIFICANT CHANGE UP (ref 5–17)
AST SERPL-CCNC: 84 U/L — HIGH (ref 10–40)
BILIRUB SERPL-MCNC: 0.4 MG/DL — SIGNIFICANT CHANGE UP (ref 0.2–1.2)
BUN SERPL-MCNC: 20 MG/DL — SIGNIFICANT CHANGE UP (ref 7–23)
CALCIUM SERPL-MCNC: 8.4 MG/DL — SIGNIFICANT CHANGE UP (ref 8.4–10.5)
CHLORIDE SERPL-SCNC: 107 MMOL/L — SIGNIFICANT CHANGE UP (ref 96–108)
CO2 SERPL-SCNC: 25 MMOL/L — SIGNIFICANT CHANGE UP (ref 22–31)
CREAT SERPL-MCNC: 1.05 MG/DL — SIGNIFICANT CHANGE UP (ref 0.5–1.3)
EGFR: 85 ML/MIN/1.73M2 — SIGNIFICANT CHANGE UP
GLUCOSE SERPL-MCNC: 113 MG/DL — HIGH (ref 70–99)
POTASSIUM SERPL-MCNC: 4 MMOL/L — SIGNIFICANT CHANGE UP (ref 3.5–5.3)
POTASSIUM SERPL-SCNC: 4 MMOL/L — SIGNIFICANT CHANGE UP (ref 3.5–5.3)
PROT SERPL-MCNC: 6.1 G/DL — SIGNIFICANT CHANGE UP (ref 6–8.3)
SODIUM SERPL-SCNC: 140 MMOL/L — SIGNIFICANT CHANGE UP (ref 135–145)

## 2023-03-14 PROCEDURE — 36415 COLL VENOUS BLD VENIPUNCTURE: CPT

## 2023-03-14 PROCEDURE — 99284 EMERGENCY DEPT VISIT MOD MDM: CPT | Mod: 25

## 2023-03-14 PROCEDURE — 85025 COMPLETE CBC W/AUTO DIFF WBC: CPT

## 2023-03-14 PROCEDURE — 83690 ASSAY OF LIPASE: CPT

## 2023-03-14 PROCEDURE — 87086 URINE CULTURE/COLONY COUNT: CPT

## 2023-03-14 PROCEDURE — 81003 URINALYSIS AUTO W/O SCOPE: CPT

## 2023-03-14 PROCEDURE — 96374 THER/PROPH/DIAG INJ IV PUSH: CPT

## 2023-03-14 PROCEDURE — 80053 COMPREHEN METABOLIC PANEL: CPT

## 2023-03-14 PROCEDURE — 74176 CT ABD & PELVIS W/O CONTRAST: CPT | Mod: MA

## 2023-03-14 PROCEDURE — 74176 CT ABD & PELVIS W/O CONTRAST: CPT | Mod: 26,MA

## 2023-03-14 RX ORDER — MORPHINE SULFATE 50 MG/1
6 CAPSULE, EXTENDED RELEASE ORAL ONCE
Refills: 0 | Status: DISCONTINUED | OUTPATIENT
Start: 2023-03-14 | End: 2023-03-14

## 2023-03-14 RX ORDER — OXYCODONE HYDROCHLORIDE 5 MG/1
1 TABLET ORAL
Qty: 9 | Refills: 0
Start: 2023-03-14 | End: 2023-03-16

## 2023-03-14 RX ORDER — SODIUM CHLORIDE 9 MG/ML
1000 INJECTION, SOLUTION INTRAVENOUS ONCE
Refills: 0 | Status: COMPLETED | OUTPATIENT
Start: 2023-03-14 | End: 2023-03-14

## 2023-03-15 ENCOUNTER — APPOINTMENT (OUTPATIENT)
Dept: UROLOGY | Facility: CLINIC | Age: 53
End: 2023-03-15
Payer: MEDICAID

## 2023-03-15 ENCOUNTER — TRANSCRIPTION ENCOUNTER (OUTPATIENT)
Age: 53
End: 2023-03-15

## 2023-03-15 VITALS
HEART RATE: 62 BPM | TEMPERATURE: 97.4 F | SYSTOLIC BLOOD PRESSURE: 108 MMHG | DIASTOLIC BLOOD PRESSURE: 61 MMHG | HEIGHT: 66 IN | WEIGHT: 145 LBS | BODY MASS INDEX: 23.3 KG/M2 | RESPIRATION RATE: 17 BRPM

## 2023-03-15 LAB
BILIRUB UR QL STRIP: NEGATIVE
CLARITY UR: CLEAR
COLLECTION METHOD: NORMAL
CULTURE RESULTS: NO GROWTH — SIGNIFICANT CHANGE UP
GLUCOSE UR-MCNC: NEGATIVE
HCG UR QL: 0.2 EU/DL
HGB UR QL STRIP.AUTO: NEGATIVE
KETONES UR-MCNC: NEGATIVE
LEUKOCYTE ESTERASE UR QL STRIP: NEGATIVE
NITRITE UR QL STRIP: NEGATIVE
PH UR STRIP: 5.5
PROT UR STRIP-MCNC: NEGATIVE
SP GR UR STRIP: 1.02
SPECIMEN SOURCE: SIGNIFICANT CHANGE UP

## 2023-03-15 PROCEDURE — 99204 OFFICE O/P NEW MOD 45 MIN: CPT

## 2023-03-15 NOTE — PHYSICAL EXAM
[General Appearance - Well Developed] : well developed [General Appearance - Well Nourished] : well nourished [Normal Appearance] : normal appearance [Well Groomed] : well groomed [General Appearance - In No Acute Distress] : no acute distress [Normal Station and Gait] : the gait and station were normal for the patient's age [Skin Color & Pigmentation] : normal skin color and pigmentation [Oriented To Time, Place, And Person] : oriented to person, place, and time [Affect] : the affect was normal [Mood] : the mood was normal [Not Anxious] : not anxious [FreeTextEntry1] : Swollen Penis

## 2023-03-15 NOTE — ADDENDUM
[FreeTextEntry1] : This note was authored by Enrique Andrews working as a scribe for Dr. Elian Regalado. I, Dr. Elian Regalado have reviewed the content of this note and confirm it is true and accurate. I personally performed the history and physical examination and made all the decisions. 03/15/2023

## 2023-03-15 NOTE — REASON FOR VISIT
[Initial Visit ___] : [unfilled] is here today for an initial visit  for [unfilled] [[employer] ] : provided by [employer]   [Interpreters_IDNumber] : [id number] [Interpreters_FullName] : Nani  [TWNoteComboBox1] : Moldovan

## 2023-03-15 NOTE — HISTORY OF PRESENT ILLNESS
[FreeTextEntry1] : 03/15/2023: Mr. BARTHOLOMEW is a 52 year old male presenting today for evaluation of penile swelling. He works as a [employer] . He was provided with a  on request. He reports hx of penile silicone injection 17 years ago by an unlicensed personal. He reports onset penile swelling 3 months ago following intercourse. Associated penile pain started 20 days ago. He reports he went to a private urologist to consider removing the silicone injection because he is not satisfied with the shape of his penis which he thinks his "deformed". He adds that he is unable to have intercourse because of the pain. The urologist ordered am MRI and he offers to show me a picture on his phone. I request that the patient obtain the CD of the MRI with the films and he was agreeable. On 03/14/2023 he went to [facility] for gross hematuria. He subsequently obtained a CT scan on 03/14/2023 and the report shows " penile edema consistent with infection." He denies known allergies to antibiotics. \par \par Assessment: Penile edema; Gross hematuria\par \par Plan: Pt provides urine sample today for POCT Urinalysis. He will have a cystoscopy. He will start Cephalexin 500 MG oral tablet 3 times a day until done. He be referred to penile reconstructive surgeon, DR Ryan. Follow up with Dr Ryan. \par \par I have discussed at length the risks and benefits and rationale behind the cystoscopy and the patient seems to understand and wants to proceed.\par \par I counseled the patient. I discussed the various etiologies of his symptoms. Risks and alternatives were discussed. I answered the patient questions. The patient will follow-up as directed and will contact me with any questions or concerns. Thank you for the opportunity to participate in the care of this patient. I'll keep you updated on his progress.

## 2023-03-22 ENCOUNTER — OUTPATIENT (OUTPATIENT)
Dept: OUTPATIENT SERVICES | Facility: HOSPITAL | Age: 53
LOS: 1 days | End: 2023-03-22
Payer: MEDICAID

## 2023-03-22 ENCOUNTER — APPOINTMENT (OUTPATIENT)
Dept: UROLOGY | Facility: CLINIC | Age: 53
End: 2023-03-22
Payer: MEDICAID

## 2023-03-22 VITALS
WEIGHT: 145 LBS | RESPIRATION RATE: 17 BRPM | DIASTOLIC BLOOD PRESSURE: 73 MMHG | HEART RATE: 61 BPM | SYSTOLIC BLOOD PRESSURE: 129 MMHG | HEIGHT: 66 IN | BODY MASS INDEX: 23.3 KG/M2

## 2023-03-22 DIAGNOSIS — R35.0 FREQUENCY OF MICTURITION: ICD-10-CM

## 2023-03-22 DIAGNOSIS — R31.0 GROSS HEMATURIA: ICD-10-CM

## 2023-03-22 PROCEDURE — 52000 CYSTOURETHROSCOPY: CPT

## 2023-03-23 DIAGNOSIS — R31.9 HEMATURIA, UNSPECIFIED: ICD-10-CM

## 2023-03-29 ENCOUNTER — OUTPATIENT (OUTPATIENT)
Dept: OUTPATIENT SERVICES | Facility: HOSPITAL | Age: 53
LOS: 1 days | End: 2023-03-29
Payer: MEDICAID

## 2023-03-29 ENCOUNTER — APPOINTMENT (OUTPATIENT)
Dept: CT IMAGING | Facility: IMAGING CENTER | Age: 53
End: 2023-03-29
Payer: MEDICAID

## 2023-03-29 PROCEDURE — 74178 CT ABD&PLV WO CNTR FLWD CNTR: CPT

## 2023-03-29 PROCEDURE — 74178 CT ABD&PLV WO CNTR FLWD CNTR: CPT | Mod: 26

## 2023-03-31 ENCOUNTER — APPOINTMENT (OUTPATIENT)
Dept: UROLOGY | Facility: CLINIC | Age: 53
End: 2023-03-31
Payer: MEDICAID

## 2023-03-31 VITALS
SYSTOLIC BLOOD PRESSURE: 127 MMHG | RESPIRATION RATE: 17 BRPM | HEART RATE: 63 BPM | WEIGHT: 150 LBS | HEIGHT: 66 IN | DIASTOLIC BLOOD PRESSURE: 67 MMHG | BODY MASS INDEX: 24.11 KG/M2

## 2023-03-31 DIAGNOSIS — N48.22 CELLULITIS OF CORPUS CAVERNOSUM AND PENIS: ICD-10-CM

## 2023-03-31 DIAGNOSIS — Z86.39 PERSONAL HISTORY OF OTHER ENDOCRINE, NUTRITIONAL AND METABOLIC DISEASE: ICD-10-CM

## 2023-03-31 DIAGNOSIS — Z78.9 OTHER SPECIFIED HEALTH STATUS: ICD-10-CM

## 2023-03-31 PROCEDURE — 99214 OFFICE O/P EST MOD 30 MIN: CPT

## 2023-03-31 NOTE — HISTORY OF PRESENT ILLNESS
[FreeTextEntry1] : Mr. BARTHOLOMEW is a 52 year old male with no significant primary medical history who presents for evaluation of penile swelling.  Referred by Dr Regalado.\par \par He works as a [employer] . He was provided with a  on request. He reports hx of penile silicone injection 17 years ago by an unlicensed personal. He reports onset penile swelling 3 months ago following intercourse. Associated penile pain started 20 days ago. He reports he went to a private urologist to consider removing the silicone injection because he is not satisfied with the shape of his penis which he thinks his "deformed". He adds that he is unable to have intercourse because of the pain. The urologist ordered am MRI and he offers to show me a picture on his phone. I request that the patient obtain the CD of the MRI with the films and he was agreeable. On 03/14/2023 he went to [facility] for gross hematuria. He subsequently obtained a CT scan on 03/14/2023 and the report shows " penile edema consistent with infection." He denies known allergies to antibiotics. \par \par He underwent cystoscopy with Dr Regalado which was unremarkable except for BPH.\par \par He was treated with keflex for penile cellulitis. No further pain. Swelling has improved but now completely resolved.  It is near baseline. \par He has had sex recently with his penis, but he always uses a condom as he is otherwise unable to penetrate.  He does not have a steady partner.\par Reports erections are normal.

## 2023-03-31 NOTE — REASON FOR VISIT
[Follow-up Visit ___] : a follow-up visit  for [unfilled] [[employer] ] : provided by [employer]   [Interpreters_IDNumber] : [id number] [TWNoteComboBox1] : Romanian

## 2023-03-31 NOTE — PHYSICAL EXAM
[General Appearance - Well Developed] : well developed [General Appearance - Well Nourished] : well nourished [Normal Appearance] : normal appearance [Well Groomed] : well groomed [General Appearance - In No Acute Distress] : no acute distress [Urethral Meatus] : meatus normal [Testes Tenderness] : no tenderness of the testes [Testes Mass (___cm)] : there were no testicular masses [FreeTextEntry1] : massively enlarged penis - there is significant silicone nodularity/lumpy implants circumferentially to the penis and extending to the prepubic and penoscrotal/upper scrotum.  Size of penis is comparable to the diameter of his biceps/upper forearm. There is mild whitish discoloration to his glans

## 2023-03-31 NOTE — ASSESSMENT
[FreeTextEntry1] : Patient is a 51 yo M who presents with massively enlarged penis from prior silicone implantation.\par He has now developed pain and recent cellulitis.\par He has difficulty with penetration and is embarrassed by appearance.\par He desires surgical reconstruction\par \par Reviewed risks of surgery including penile sensation loss, sexual/erectile dysfunction, poor graft take, wound healing/infection etc.\par D/w pt that he requires complex reconstruction of penis and would consider addressing primarily penile shaft, while leaving his scrotum and prepubic area alone\par D/w pt that will attempt to preserve penile skin in order to preserve as much sensation as possible\par D/w pt postop course and would leave kang postop\par All questions answered\par Pt wishes to proceed w OR\par

## 2023-04-05 ENCOUNTER — OUTPATIENT (OUTPATIENT)
Dept: OUTPATIENT SERVICES | Facility: HOSPITAL | Age: 53
LOS: 1 days | End: 2023-04-05
Payer: MEDICAID

## 2023-04-05 ENCOUNTER — APPOINTMENT (OUTPATIENT)
Dept: UROLOGY | Facility: CLINIC | Age: 53
End: 2023-04-05

## 2023-04-05 VITALS
HEIGHT: 67 IN | HEART RATE: 67 BPM | WEIGHT: 149.91 LBS | SYSTOLIC BLOOD PRESSURE: 126 MMHG | DIASTOLIC BLOOD PRESSURE: 82 MMHG | OXYGEN SATURATION: 98 % | TEMPERATURE: 98 F | RESPIRATION RATE: 16 BRPM

## 2023-04-05 DIAGNOSIS — Q55.69 OTHER CONGENITAL MALFORMATION OF PENIS: ICD-10-CM

## 2023-04-05 DIAGNOSIS — Z01.818 ENCOUNTER FOR OTHER PREPROCEDURAL EXAMINATION: ICD-10-CM

## 2023-04-05 DIAGNOSIS — N48.89 OTHER SPECIFIED DISORDERS OF PENIS: ICD-10-CM

## 2023-04-05 LAB
ALBUMIN SERPL ELPH-MCNC: 4.8 G/DL — SIGNIFICANT CHANGE UP (ref 3.3–5)
ALP SERPL-CCNC: 81 U/L — SIGNIFICANT CHANGE UP (ref 40–120)
ALT FLD-CCNC: 37 U/L — SIGNIFICANT CHANGE UP (ref 10–45)
ANION GAP SERPL CALC-SCNC: 12 MMOL/L — SIGNIFICANT CHANGE UP (ref 5–17)
AST SERPL-CCNC: 24 U/L — SIGNIFICANT CHANGE UP (ref 10–40)
BILIRUB SERPL-MCNC: 0.5 MG/DL — SIGNIFICANT CHANGE UP (ref 0.2–1.2)
BUN SERPL-MCNC: 23 MG/DL — SIGNIFICANT CHANGE UP (ref 7–23)
CALCIUM SERPL-MCNC: 9.5 MG/DL — SIGNIFICANT CHANGE UP (ref 8.4–10.5)
CHLORIDE SERPL-SCNC: 103 MMOL/L — SIGNIFICANT CHANGE UP (ref 96–108)
CO2 SERPL-SCNC: 23 MMOL/L — SIGNIFICANT CHANGE UP (ref 22–31)
CREAT SERPL-MCNC: 0.91 MG/DL — SIGNIFICANT CHANGE UP (ref 0.5–1.3)
EGFR: 101 ML/MIN/1.73M2 — SIGNIFICANT CHANGE UP
GLUCOSE SERPL-MCNC: 93 MG/DL — SIGNIFICANT CHANGE UP (ref 70–99)
HCT VFR BLD CALC: 44.7 % — SIGNIFICANT CHANGE UP (ref 39–50)
HGB BLD-MCNC: 14.4 G/DL — SIGNIFICANT CHANGE UP (ref 13–17)
MCHC RBC-ENTMCNC: 26.5 PG — LOW (ref 27–34)
MCHC RBC-ENTMCNC: 32.2 GM/DL — SIGNIFICANT CHANGE UP (ref 32–36)
MCV RBC AUTO: 82.3 FL — SIGNIFICANT CHANGE UP (ref 80–100)
NRBC # BLD: 0 /100 WBCS — SIGNIFICANT CHANGE UP (ref 0–0)
PLATELET # BLD AUTO: 173 K/UL — SIGNIFICANT CHANGE UP (ref 150–400)
POTASSIUM SERPL-MCNC: 3.7 MMOL/L — SIGNIFICANT CHANGE UP (ref 3.5–5.3)
POTASSIUM SERPL-SCNC: 3.7 MMOL/L — SIGNIFICANT CHANGE UP (ref 3.5–5.3)
PROT SERPL-MCNC: 7.8 G/DL — SIGNIFICANT CHANGE UP (ref 6–8.3)
RBC # BLD: 5.43 M/UL — SIGNIFICANT CHANGE UP (ref 4.2–5.8)
RBC # FLD: 13.9 % — SIGNIFICANT CHANGE UP (ref 10.3–14.5)
SODIUM SERPL-SCNC: 138 MMOL/L — SIGNIFICANT CHANGE UP (ref 135–145)
WBC # BLD: 5.79 K/UL — SIGNIFICANT CHANGE UP (ref 3.8–10.5)
WBC # FLD AUTO: 5.79 K/UL — SIGNIFICANT CHANGE UP (ref 3.8–10.5)

## 2023-04-05 PROCEDURE — 85027 COMPLETE CBC AUTOMATED: CPT

## 2023-04-05 PROCEDURE — G0463: CPT

## 2023-04-05 PROCEDURE — 80053 COMPREHEN METABOLIC PANEL: CPT

## 2023-04-05 RX ORDER — SODIUM CHLORIDE 9 MG/ML
3 INJECTION INTRAMUSCULAR; INTRAVENOUS; SUBCUTANEOUS EVERY 8 HOURS
Refills: 0 | Status: DISCONTINUED | OUTPATIENT
Start: 2023-04-19 | End: 2023-05-03

## 2023-04-05 RX ORDER — SODIUM CHLORIDE 9 MG/ML
1000 INJECTION, SOLUTION INTRAVENOUS
Refills: 0 | Status: DISCONTINUED | OUTPATIENT
Start: 2023-04-19 | End: 2023-05-03

## 2023-04-05 RX ORDER — LIDOCAINE HCL 20 MG/ML
0.2 VIAL (ML) INJECTION ONCE
Refills: 0 | Status: DISCONTINUED | OUTPATIENT
Start: 2023-04-19 | End: 2023-05-03

## 2023-04-05 NOTE — H&P PST ADULT - LAST STRESS TEST
Orlando Dyson (:  1963) is a 60 y.o. male,Established patient, here for evaluation of the following chief complaint(s):  Follow-up      ASSESSMENT/PLAN:  1. Type 2 diabetes mellitus without complication, without long-term current use of insulin ([facility]ly [facility])  Assessment & Plan:   A1c today controlled at 5.5. It is up a little bit from last check though. He is interested in starting medication to help with his overall diabetes management and weight loss. Discussed with Dr. Lissette Arguelles and we are in agreement to proceed with Ozempic. We will start him on 0.25 mg weekly. First dose given in office today. Patient was educated on the use of pen and how to inject the medication. He will continue at 0.25 mg for 4 weeks and then increase to 0.5 mg as long as he is tolerating with minimal side effects. He will follow-up in a total of 6 weeks to see how he is doing and if there is any signs of weight loss or improvement. Orders:  -     POCT glycosylated hemoglobin (Hb A1C)  2. Class 3 severe obesity due to excess calories with serious comorbidity and body mass index (BMI) of 45.0 to 49.9 in adult [facility])  Assessment & Plan:   He continues to follow with bariatrics, starting Ozempic as discussed in note. 3. Essential hypertension, benign  Assessment & Plan:   Blood pressure is excellently controlled he is tolerating his medications well no need for changes or adjustments at this time. 4. RLS (restless legs syndrome)  Assessment & Plan:   Requip is working well he is more having any further issues continued medication at this time as he is stable and controlled. No follow-ups on file. SUBJECTIVE/OBJECTIVE:  HPI  Patient in the office today for routine follow up. He followed up bariatrics today. He has lost about 100 pounds he is started to plateau from his weight loss after bariatric surgery. Dr. Elena Hernández had discussed medication weight loss options. He is interested in this.   He has been taking all of his other medications as prescribed without any issues. He has an A1c of 5.5 today. He is tolerating the Metformin well. He is interested in adding a medication that can help with his weight loss and would like to do that today if able. Current Outpatient Medications   Medication Sig Dispense Refill    Insulin Pen Needle (MEIJER PEN NEEDLES) 31G X 6 MM MISC 1 each by Does not apply route daily 100 each 3    atorvastatin (LIPITOR) 40 MG tablet Take 1 tablet by mouth daily 90 tablet 3    rOPINIRole (REQUIP) 3 MG tablet Take 1 tablet by mouth 3 times daily 270 tablet 0    pregabalin (LYRICA) 75 MG capsule Take 1 capsule by mouth 3 times daily for 90 days. 270 capsule 0    Prodigy Twist Top Lancets 28G MISC TEST TWO TIMES A  each 0    blood glucose test strips (PRODIGY NO CODING BLOOD GLUC) strip TEST TWO TIMES A  each 0    metFORMIN (GLUCOPHAGE) 500 MG tablet Take 1 tablet by mouth 2 times daily (with meals) 180 tablet 1    hydroCHLOROthiazide (HYDRODIURIL) 25 MG tablet Hold until Monday 90 tablet 0    loratadine (CLARITIN) 10 MG tablet Take 1 tablet by mouth daily 90 tablet 0    lisinopril (PRINIVIL;ZESTRIL) 10 MG tablet TAKE 1 TABLET BY MOUTH ONCE A DAY 90 tablet 0    potassium chloride (KLOR-CON M) 20 MEQ TBCR extended release tablet Take 1 tablet by mouth 2 times daily 120 tablet 2    omeprazole (PRILOSEC) 20 MG delayed release capsule Take 1 capsule by mouth Daily 30 capsule 3    furosemide (LASIX) 40 MG tablet Take 1 tablet by mouth 2 times daily 60 tablet 3    mupirocin (BACTROBAN) 2 % ointment Apply pea size amount to each nostril 2 times daily.  22 g 0    ondansetron (ZOFRAN-ODT) 4 MG disintegrating tablet Take 1 tablet by mouth 3 times daily as needed for Nausea or Vomiting 21 tablet 0    Multiple Vitamin (MULTIVITAMIN, BARIATRIC FUSION COMPLETE, CHEW TAB) Take 4 tablets by mouth daily      blood glucose monitor kit and supplies Check BG twice daily 1 kit 0    glucose 5 g chewable tablet Take 3 tablets by mouth as needed for Low blood sugar 30 tablet 0     No current facility-administered medications for this visit. Review of Systems   Constitutional: Negative for chills, fatigue and fever. HENT: Negative for congestion, ear pain, postnasal drip, rhinorrhea, sinus pressure, sneezing and sore throat. Eyes: Negative for redness and itching. Respiratory: Negative for cough, chest tightness, shortness of breath and wheezing. Cardiovascular: Negative for chest pain and palpitations. Gastrointestinal: Negative for abdominal pain, blood in stool, constipation, diarrhea, nausea and vomiting. Endocrine: Negative for cold intolerance and heat intolerance. Genitourinary: Negative for difficulty urinating, dysuria, flank pain, frequency, hematuria and urgency. Musculoskeletal: Negative for arthralgias, back pain, joint swelling and myalgias. Skin: Negative for color change, pallor, rash and wound. Allergic/Immunologic: Negative for environmental allergies and food allergies. Neurological: Negative for dizziness, seizures, syncope, weakness, light-headedness, numbness and headaches. Hematological: Negative for adenopathy. Does not bruise/bleed easily. Psychiatric/Behavioral: Negative for confusion, sleep disturbance and suicidal ideas. The patient is not nervous/anxious and is not hyperactive. Vitals:    03/09/22 0808   BP: 108/70   Site: Left Upper Arm   Position: Sitting   Cuff Size: Large Adult   Pulse: 90   Temp: 96.8 °F (36 °C)   SpO2: 98%   Weight: (!) 305 lb (138.3 kg)   Height: 5' 8\" (1.727 m)       Physical Exam  Constitutional:       Appearance: Normal appearance. He is normal weight. HENT:      Head: Normocephalic and atraumatic. Right Ear: Tympanic membrane, ear canal and external ear normal.      Left Ear: Tympanic membrane, ear canal and external ear normal.      Nose: Nose normal.      Mouth/Throat:      Mouth: Mucous membranes are dry. Eyes:      Extraocular Movements: Extraocular movements intact. Conjunctiva/sclera: Conjunctivae normal.      Pupils: Pupils are equal, round, and reactive to light. Cardiovascular:      Rate and Rhythm: Normal rate and regular rhythm. Pulses: Normal pulses. Heart sounds: Normal heart sounds. Pulmonary:      Effort: Pulmonary effort is normal.      Breath sounds: Normal breath sounds. Abdominal:      General: Abdomen is flat. Bowel sounds are normal.      Palpations: Abdomen is soft. Tenderness: There is no abdominal tenderness. Musculoskeletal:         General: Normal range of motion. Cervical back: Normal range of motion and neck supple. Skin:     General: Skin is warm and dry. Neurological:      General: No focal deficit present. Mental Status: He is alert and oriented to person, place, and time. Psychiatric:         Mood and Affect: Mood normal.         Behavior: Behavior normal.           On this date 3/9/2022 I have spent 30 minutes reviewing previous notes, test results and face to face with the patient discussing the diagnosis and importance of compliance with the treatment plan as well as documenting on the day of the visit. An electronic signature was used to authenticate this note.     --Judith Torres, JACKLYN - CNP Denies

## 2023-04-05 NOTE — H&P PST ADULT - PROBLEM SELECTOR PLAN 1
Pt is scheduled for a penile reconstruction, possible penile or scrotal flaps and possible skin grafting with Dr. [facility]  CBC, CMP ordered and obtained at PST  M/E from 1 month ago requested

## 2023-04-05 NOTE — H&P PST ADULT - ASSESSMENT
DASI score: 7.99  DASI activity: Walking x 40 minutes outdoors 5x per week, ADLs, Grocery Shopping, 1 Flight of Stairs   Loose teeth or denture: denies

## 2023-04-05 NOTE — H&P PST ADULT - NSICDXPASTMEDICALHX_GEN_ALL_CORE_FT
PAST MEDICAL HISTORY:  Acute pancreatitis     Other congenital malformation of penis     Psoriasis     Thyroid nodule      PAST MEDICAL HISTORY:  2019 novel coronavirus disease (COVID-19)     Acute pancreatitis     Other congenital malformation of penis     Penile cellulitis     Psoriasis     Thyroid nodule

## 2023-04-05 NOTE — H&P PST ADULT - HISTORY OF PRESENT ILLNESS
52 year old Setswana-speaking male with PMH Thyroid Nodule (managed by endo - negative workup as per patient)    Fully vaccinated with Covid Series  Denies recent travel, exposure or Covid symptoms  Covid + 2/10/23 - Asymptomatic - Hospitalized for acute pancreatitis 2/10/23-2/14/23 and incidentally tested positive for Covid - No SOB/No Supplemental O2 - Treated with IV Hydration 52 year old Croatian-speaking male with PMH Thyroid Nodule (managed by endo - negative workup as per patient) reports c/o penile swelling x 3 months following intercourse with associated penile pain about 1 month ago. He states he is unable to have intercourse because of the pain. On 3/14/23, he went to the hospital for gross hematuria s/p CT scan showed "penile edema consistent with infection." He underwent a cystoscopy which was unremarkable except for BPH. He was treated with keflex for penile cellulitis. He had no more pain and swelling had improved and is now completely gone. Pt now presents to PST for scheduled penile reconstruction, possible penile or scrotal flaps and possible skin grafting with Dr. Ryan on 4/19/23 at the ambulatory care center.    Fully vaccinated with Covid Series  Denies recent travel, exposure or Covid symptoms  Covid + 2/10/23 - Asymptomatic - Hospitalized for acute pancreatitis 2/10/23-2/14/23 and incidentally tested positive for Covid - No SOB/No Supplemental O2 - Treated with IV Hydration - followed up with PCP

## 2023-04-18 ENCOUNTER — TRANSCRIPTION ENCOUNTER (OUTPATIENT)
Age: 53
End: 2023-04-18

## 2023-04-19 ENCOUNTER — TRANSCRIPTION ENCOUNTER (OUTPATIENT)
Age: 53
End: 2023-04-19

## 2023-04-19 ENCOUNTER — APPOINTMENT (OUTPATIENT)
Dept: UROLOGY | Facility: HOSPITAL | Age: 53
End: 2023-04-19
Payer: MEDICAID

## 2023-04-19 ENCOUNTER — OUTPATIENT (OUTPATIENT)
Dept: OUTPATIENT SERVICES | Facility: HOSPITAL | Age: 53
LOS: 1 days | End: 2023-04-19
Payer: MEDICAID

## 2023-04-19 VITALS
RESPIRATION RATE: 18 BRPM | OXYGEN SATURATION: 97 % | DIASTOLIC BLOOD PRESSURE: 71 MMHG | HEART RATE: 72 BPM | SYSTOLIC BLOOD PRESSURE: 111 MMHG | TEMPERATURE: 98 F

## 2023-04-19 VITALS
HEIGHT: 67 IN | WEIGHT: 149.91 LBS | TEMPERATURE: 98 F | DIASTOLIC BLOOD PRESSURE: 62 MMHG | HEART RATE: 56 BPM | RESPIRATION RATE: 16 BRPM | OXYGEN SATURATION: 98 % | SYSTOLIC BLOOD PRESSURE: 106 MMHG

## 2023-04-19 DIAGNOSIS — Q55.69 OTHER CONGENITAL MALFORMATION OF PENIS: ICD-10-CM

## 2023-04-19 DIAGNOSIS — N48.89 OTHER SPECIFIED DISORDERS OF PENIS: ICD-10-CM

## 2023-04-19 PROCEDURE — 88304 TISSUE EXAM BY PATHOLOGIST: CPT

## 2023-04-19 PROCEDURE — 14040 TIS TRNFR F/C/C/M/N/A/G/H/F: CPT

## 2023-04-19 PROCEDURE — 54161 CIRCUM 28 DAYS OR OLDER: CPT

## 2023-04-19 PROCEDURE — 99024 POSTOP FOLLOW-UP VISIT: CPT

## 2023-04-19 PROCEDURE — 55175 REVISION OF SCROTUM: CPT

## 2023-04-19 PROCEDURE — 88304 TISSUE EXAM BY PATHOLOGIST: CPT | Mod: 26

## 2023-04-19 PROCEDURE — 54300 REVISION OF PENIS: CPT

## 2023-04-19 PROCEDURE — 10121 INC&RMVL FB SUBQ TISS COMP: CPT

## 2023-04-19 PROCEDURE — 14041 TIS TRNFR F/C/C/M/N/A/G/H/F: CPT

## 2023-04-19 RX ORDER — OXYCODONE HYDROCHLORIDE 5 MG/1
5 TABLET ORAL ONCE
Refills: 0 | Status: DISCONTINUED | OUTPATIENT
Start: 2023-04-19 | End: 2023-04-19

## 2023-04-19 RX ORDER — IBUPROFEN 200 MG
1 TABLET ORAL
Qty: 21 | Refills: 0
Start: 2023-04-19 | End: 2023-04-25

## 2023-04-19 RX ORDER — CEFAZOLIN SODIUM 1 G
2000 VIAL (EA) INJECTION ONCE
Refills: 0 | Status: COMPLETED | OUTPATIENT
Start: 2023-04-19 | End: 2023-04-19

## 2023-04-19 RX ORDER — ONDANSETRON 8 MG/1
4 TABLET, FILM COATED ORAL ONCE
Refills: 0 | Status: DISCONTINUED | OUTPATIENT
Start: 2023-04-19 | End: 2023-05-03

## 2023-04-19 RX ORDER — ACETAMINOPHEN WITH CODEINE 300MG-30MG
1 TABLET ORAL
Qty: 10 | Refills: 0
Start: 2023-04-19

## 2023-04-19 RX ADMIN — SODIUM CHLORIDE 100 MILLILITER(S): 9 INJECTION, SOLUTION INTRAVENOUS at 05:57

## 2023-04-19 NOTE — ASU DISCHARGE PLAN (ADULT/PEDIATRIC) - ASU DC SPECIAL INSTRUCTIONSFT
Follow up with Dr Ryan next week to have the kang catheter and dressing removed by Dr Ryan in office.  Call [phone] to make an appointment.

## 2023-04-19 NOTE — ASU DISCHARGE PLAN (ADULT/PEDIATRIC) - NURSING INSTRUCTIONS
You can take Tylenol (Acetaminophen) every 6 hours and Motrin (Ibuprofen) every 6 hours, as needed. You may alternate these medications such that you take one or the other every 3 hours for around the clock pain coverage. Do not exceed 4000mg of Tylenol (Acetaminophen) daily. You received Tylenol in the OR at 11am. The next time you can take Tylenol is at 5pm. You also received Toradol in the OR at 11am. The next time you can take Ibuprofen is at 5pm. You can take Tylenol (Acetaminophen) every 6 hours and Motrin (Ibuprofen) every 6 hours, as needed. You may alternate these medications such that you take one or the other every 3 hours for around the clock pain coverage. Do not exceed 4000mg of Tylenol (Acetaminophen) daily. You received Tylenol in the OR at 11am. The next time you can take Tylenol is at 5pm. You also received Toradol in the OR at 11am. The next time you can take Ibuprofen is at 5pm.    IF TAKING (ACETAMINOPHEN-CODEINE) DO NOT TAKE TYLENOL AS THIS MEDICATION CONTAINS TYLENOL.

## 2023-04-19 NOTE — ASU DISCHARGE PLAN (ADULT/PEDIATRIC) - NS MD DC FALL RISK RISK
For information on Fall & Injury Prevention, visit: [email or web address] OR  [email or web address] OR  https://www.cdc.gov/steadi/patient.html

## 2023-04-19 NOTE — ASU DISCHARGE PLAN (ADULT/PEDIATRIC) - CARE PROVIDER_API CALL
Rahul [place])  Urology  [address] [place] [address], [place] [address]  Phone: [phone]  Fax: [phone]  Follow Up Time:

## 2023-04-19 NOTE — ASU PATIENT PROFILE, ADULT - ABILITY TO HEAR (WITH HEARING AID OR HEARING APPLIANCE IF NORMALLY USED):
2nd call, left message to call back to schedule colonoscopy.   Last colonoscopy: 12/29/2016  Reason: 5 yr follow up, tubular adenoma Adequate: hears normal conversation without difficulty

## 2023-04-19 NOTE — ASU PATIENT PROFILE, ADULT - NSICDXPASTMEDICALHX_GEN_ALL_CORE_FT
PAST MEDICAL HISTORY:  2019 novel coronavirus disease (COVID-19)     Acute pancreatitis     Other congenital malformation of penis     Penile cellulitis     Psoriasis     Thyroid nodule

## 2023-04-19 NOTE — ASU PATIENT PROFILE, ADULT - FALL HARM RISK - UNIVERSAL INTERVENTIONS
Bed in lowest position, wheels locked, appropriate side rails in place/Call bell, personal items and telephone in reach/Instruct patient to call for assistance before getting out of bed or chair/Non-slip footwear when patient is out of bed/[place] to call system/Physically safe environment - no spills, clutter or unnecessary equipment/Purposeful Proactive Rounding/Room/bathroom lighting operational, light cord in reach

## 2023-04-20 PROBLEM — E04.1 NONTOXIC SINGLE THYROID NODULE: Chronic | Status: ACTIVE | Noted: 2023-04-05

## 2023-04-20 PROBLEM — U07.1 COVID-19: Chronic | Status: ACTIVE | Noted: 2023-04-05

## 2023-04-20 PROBLEM — K85.90 ACUTE PANCREATITIS WITHOUT NECROSIS OR INFECTION, UNSPECIFIED: Chronic | Status: ACTIVE | Noted: 2023-04-05

## 2023-04-20 PROBLEM — Q55.69 OTHER CONGENITAL MALFORMATION OF PENIS: Chronic | Status: ACTIVE | Noted: 2023-04-05

## 2023-04-20 PROBLEM — N48.22: Chronic | Status: ACTIVE | Noted: 2023-04-05

## 2023-04-25 ENCOUNTER — OUTPATIENT (OUTPATIENT)
Dept: OUTPATIENT SERVICES | Facility: HOSPITAL | Age: 53
LOS: 1 days | End: 2023-04-25
Payer: MEDICAID

## 2023-04-25 ENCOUNTER — APPOINTMENT (OUTPATIENT)
Dept: UROLOGY | Facility: CLINIC | Age: 53
End: 2023-04-25
Payer: MEDICAID

## 2023-04-25 DIAGNOSIS — R35.0 FREQUENCY OF MICTURITION: ICD-10-CM

## 2023-04-25 DIAGNOSIS — Q55.69 OTHER CONGENITAL MALFORMATION OF PENIS: ICD-10-CM

## 2023-04-25 PROCEDURE — 51700 IRRIGATION OF BLADDER: CPT

## 2023-04-25 PROCEDURE — 51700 IRRIGATION OF BLADDER: CPT | Mod: 58

## 2023-04-27 LAB — SURGICAL PATHOLOGY STUDY: SIGNIFICANT CHANGE UP

## 2023-05-02 ENCOUNTER — APPOINTMENT (OUTPATIENT)
Dept: UROLOGY | Facility: CLINIC | Age: 53
End: 2023-05-02
Payer: MEDICAID

## 2023-05-02 ENCOUNTER — NON-APPOINTMENT (OUTPATIENT)
Age: 53
End: 2023-05-02

## 2023-05-02 VITALS
BODY MASS INDEX: 23.54 KG/M2 | WEIGHT: 150 LBS | RESPIRATION RATE: 17 BRPM | SYSTOLIC BLOOD PRESSURE: 135 MMHG | OXYGEN SATURATION: 98 % | DIASTOLIC BLOOD PRESSURE: 81 MMHG | HEART RATE: 61 BPM | HEIGHT: 67 IN

## 2023-05-02 PROCEDURE — 99024 POSTOP FOLLOW-UP VISIT: CPT

## 2023-05-02 NOTE — PHYSICAL EXAM
[FreeTextEntry1] : there is evidence of ventral skin sloughing of penile flap with granulation tissue and edema.  Incisions intact/clean.  No active discharge.  No fluid collection or abscess.  There is significant edema diffusely in penis and scrotum

## 2023-05-02 NOTE — ASSESSMENT
[FreeTextEntry1] : Patient is a 51 yo M who presents with massively enlarged penis from prior silicone implantation.\par S/p penile surgical reconstruction with skin flaps, circumcision and scrotoplasty\par \par D/w pt that there is no obvious infection, but concern given his use of OTC wound sealants that there can be trapped bacteria - will send rx for abx and instructed to apply abx ointment\par D/w pt that his skin ventrally appears to be sloughing and likely will completely slough and will need to reepithelialize\par Local wound care discussed\par F/u 1 wk\par

## 2023-05-02 NOTE — HISTORY OF PRESENT ILLNESS
[FreeTextEntry1] : Mr. BARTHOLOMEW is a 52 year old male with no significant primary medical history who presents for penile anomaly.\par \par HPI:\par He works as a [employer] . He was provided with a  on request. He reports hx of penile silicone injection 17 years ago by an unlicensed personal. He reports onset penile swelling 3 months ago following intercourse. Associated penile pain started 20 days ago. He reports he went to a private urologist to consider removing the silicone injection because he is not satisfied with the shape of his penis which he thinks his "deformed". He adds that he is unable to have intercourse because of the pain. The urologist ordered am MRI and he offers to show me a picture on his phone. I request that the patient obtain the CD of the MRI with the films and he was agreeable. On 03/14/2023 he went to [facility] for gross hematuria. He subsequently obtained a CT scan on 03/14/2023 and the report shows " penile edema consistent with infection." He denies known allergies to antibiotics. \par \par He underwent cystoscopy with Dr Regalado which was unremarkable except for BPH.\par \par Interval hx:\par Now s/p penile reconstruction with skin flap 4/19/23\par Comes in today for persistent drainage from his penis/wound.  He has been applying a wound powder and liquid glue that he purchased OTC.\par No fever/chills\par No difficulty voiding.\par

## 2023-05-09 ENCOUNTER — APPOINTMENT (OUTPATIENT)
Dept: UROLOGY | Facility: CLINIC | Age: 53
End: 2023-05-09
Payer: MEDICAID

## 2023-05-09 ENCOUNTER — APPOINTMENT (OUTPATIENT)
Dept: UROLOGY | Facility: CLINIC | Age: 53
End: 2023-05-09

## 2023-05-09 VITALS
WEIGHT: 150 LBS | RESPIRATION RATE: 17 BRPM | DIASTOLIC BLOOD PRESSURE: 73 MMHG | SYSTOLIC BLOOD PRESSURE: 131 MMHG | HEIGHT: 67 IN | BODY MASS INDEX: 23.54 KG/M2 | HEART RATE: 62 BPM

## 2023-05-09 PROCEDURE — 99024 POSTOP FOLLOW-UP VISIT: CPT

## 2023-05-09 RX ORDER — BACITRACIN 500 [IU]/G
500 OINTMENT TOPICAL TWICE DAILY
Qty: 30 | Refills: 3 | Status: ACTIVE | COMMUNITY
Start: 2023-05-02 | End: 1900-01-01

## 2023-05-09 NOTE — ASSESSMENT
[FreeTextEntry1] : Patient is a 53 yo M who presents with massively enlarged penis from prior silicone implantation.\par S/p penile surgical reconstruction with skin flaps, circumcision and scrotoplasty\par \par D/w pt that at this time, will observe off abx, cont topical abx ointment and local wound care\par D/w pt that his skin ventrally appears to be sloughing and likely will completely slough and will need to reepithelialize - but no worsening of sloughing and exam appears stable/mildly improved\par D/w pt that will likely take months to heal\par F/u 3 wks\par

## 2023-05-09 NOTE — PHYSICAL EXAM
[General Appearance - Well Developed] : well developed [General Appearance - Well Nourished] : well nourished [Normal Appearance] : normal appearance [Well Groomed] : well groomed [General Appearance - In No Acute Distress] : no acute distress [Urethral Meatus] : meatus normal [FreeTextEntry1] : Stable/slightly improved exam - there is evidence of ventral skin sloughing of penile flap with granulation tissue and edema.  Incisions intact/clean.  No active discharge.  No fluid collection or abscess.  There is significant edema diffusely in penis and scrotum

## 2023-05-09 NOTE — HISTORY OF PRESENT ILLNESS
[FreeTextEntry1] : Mr. BARTHOLOMEW is a 52 year old male with no significant primary medical history who presents for penile anomaly.\par \par HPI:\par He works as a [employer] . He was provided with a  on request. He reports hx of penile silicone injection 17 years ago by an unlicensed personal. He reports onset penile swelling 3 months ago following intercourse. Associated penile pain started 20 days ago. He reports he went to a private urologist to consider removing the silicone injection because he is not satisfied with the shape of his penis which he thinks his "deformed". He adds that he is unable to have intercourse because of the pain. The urologist ordered am MRI and he offers to show me a picture on his phone. I request that the patient obtain the CD of the MRI with the films and he was agreeable. On 03/14/2023 he went to [facility] for gross hematuria. He subsequently obtained a CT scan on 03/14/2023 and the report shows " penile edema consistent with infection." He denies known allergies to antibiotics. \par \par He underwent cystoscopy with Dr Regalado which was unremarkable except for BPH.\par \par Interval hx:\par Now s/p penile reconstruction with skin flap 4/19/23\par Here for postop check.\par Drainage and bleeding have improved/decreased.  No further bleeding.\par No fever/chills\par No difficulty voiding.\par Overall he is feeling better and less pain.

## 2023-05-30 ENCOUNTER — APPOINTMENT (OUTPATIENT)
Dept: UROLOGY | Facility: CLINIC | Age: 53
End: 2023-05-30
Payer: MEDICAID

## 2023-05-30 VITALS
BODY MASS INDEX: 23.54 KG/M2 | WEIGHT: 150 LBS | RESPIRATION RATE: 17 BRPM | DIASTOLIC BLOOD PRESSURE: 74 MMHG | HEIGHT: 67 IN | SYSTOLIC BLOOD PRESSURE: 123 MMHG | HEART RATE: 61 BPM

## 2023-05-30 PROCEDURE — 99024 POSTOP FOLLOW-UP VISIT: CPT

## 2023-05-30 RX ORDER — AMOXICILLIN AND CLAVULANATE POTASSIUM 875; 125 MG/1; MG/1
875-125 TABLET, COATED ORAL
Qty: 14 | Refills: 0 | Status: DISCONTINUED | COMMUNITY
Start: 2023-05-02 | End: 2023-05-30

## 2023-05-30 RX ORDER — CEPHALEXIN 500 MG/1
500 CAPSULE ORAL 3 TIMES DAILY
Qty: 21 | Refills: 0 | Status: DISCONTINUED | COMMUNITY
Start: 2023-03-15 | End: 2023-05-30

## 2023-05-30 NOTE — PHYSICAL EXAM
[General Appearance - Well Developed] : well developed [General Appearance - Well Nourished] : well nourished [Normal Appearance] : normal appearance [Well Groomed] : well groomed [General Appearance - In No Acute Distress] : no acute distress [Urethral Meatus] : meatus normal [FreeTextEntry1] : Much improved exam - there is granulation tissue noted ventrally where previous skin sloughing had occurred.  Very small area of granulation tissue <~0.5cm on dorsal penile shaft skin.  There is otherwise well healing skin.  There is ventral subcoronal skin edema.  No evidence for infection.  Incisions well healing

## 2023-05-30 NOTE — REASON FOR VISIT
[Follow-up Visit ___] : a follow-up visit  for [unfilled] [[employer] ] : provided by [employer]   [Source: ______] : History obtained from [unfilled] [Interpreters_IDNumber] : [id number] [Interpreters_FullName] : Ani/Oliver [TWNoteComboBox1] : Surinames[place]

## 2023-05-30 NOTE — ASSESSMENT
[FreeTextEntry1] : Patient is a 53 yo M who presents with massively enlarged penis from prior silicone implantation.\par S/p penile surgical reconstruction with skin flaps, circumcision and scrotoplasty 4/19/23\par \par Exam is improved\par There is an area of ventral midline granulation tissue - pink and healthy\par Cont local wound care\par D/w pt that will observe and can take month(s) to heal\par F/u 6 wks

## 2023-05-30 NOTE — HISTORY OF PRESENT ILLNESS
[FreeTextEntry1] : Mr. BARTHOLOMEW is a 52 year old male with no significant primary medical history who presents for penile anomaly.\par \par HPI:\par He works as a [employer] . He was provided with a  on request. He reports hx of penile silicone injection 17 years ago by an unlicensed personal. He reports onset penile swelling 3 months ago following intercourse. Associated penile pain started 20 days ago. He reports he went to a private urologist to consider removing the silicone injection because he is not satisfied with the shape of his penis which he thinks his "deformed". He adds that he is unable to have intercourse because of the pain. The urologist ordered am MRI and he offers to show me a picture on his phone. I request that the patient obtain the CD of the MRI with the films and he was agreeable. On 03/14/2023 he went to [facility] for gross hematuria. He subsequently obtained a CT scan on 03/14/2023 and the report shows " penile edema consistent with infection." He denies known allergies to antibiotics. \par \par He underwent cystoscopy with Dr Regalado which was unremarkable except for BPH.\par \par Interval hx:\par Now s/p penile reconstruction with skin flap 4/19/23\par He returns today for postop follow up. \par He still noticed drainage from incision, but no more bleeding. \par No fever/chills\par He has no voiding issue, no hematuria, dysuria or difficulty voiding.\par Overall he is feeling better and no pain.

## 2023-06-02 ENCOUNTER — APPOINTMENT (OUTPATIENT)
Dept: UROLOGY | Facility: CLINIC | Age: 53
End: 2023-06-02

## 2023-07-11 ENCOUNTER — APPOINTMENT (OUTPATIENT)
Dept: UROLOGY | Facility: CLINIC | Age: 53
End: 2023-07-11
Payer: MEDICAID

## 2023-07-11 VITALS
DIASTOLIC BLOOD PRESSURE: 68 MMHG | SYSTOLIC BLOOD PRESSURE: 109 MMHG | HEART RATE: 66 BPM | OXYGEN SATURATION: 95 % | RESPIRATION RATE: 17 BRPM | TEMPERATURE: 97.6 F

## 2023-07-11 PROCEDURE — 99024 POSTOP FOLLOW-UP VISIT: CPT

## 2023-07-11 NOTE — REASON FOR VISIT
[[employer] ] : provided by [employer]   [Interpreters_IDNumber] : [id number] [Interpreters_FullName] : Kp [TWNoteComboBox1] : American

## 2023-07-11 NOTE — PHYSICAL EXAM
[Normal Appearance] : normal appearance [] : no respiratory distress [Exaggerated Use Of Accessory Muscles For Inspiration] : no accessory muscle use [Oriented To Time, Place, And Person] : oriented to person, place, and time [Normal Station and Gait] : the gait and station were normal for the patient's age [No Focal Deficits] : no focal deficits [Urethral Meatus] : meatus normal [FreeTextEntry1] : Well epitheliazed, no significant granulation tissue.  There is a tiny 5mm area of granulation tissue at penoscrotal junction.  There is mild edema of penile shaft/base, moderate area of edema at ventral midline aspect of subcoronal skin. No evidence for infection.  Incisions well healing

## 2023-07-11 NOTE — ASSESSMENT
[FreeTextEntry1] : Patient is a 54 yo M who presents with massively enlarged penis from prior silicone implantation.\par S/p penile surgical reconstruction with skin flaps, circumcision and scrotoplasty 4/19/23\par \par Exam is improved significantly\par There is an area of ventral penile subcoronal skin edema\par D/w pt that to cont massage therapy to this edema.  If no improvement would consider surgical excision of lymphedematous tissue\par Cont local wound care\par D/w pt that will observe and can take month(s) to heal\par Ok to resume sexual activity\par F/u 3 mos

## 2023-07-11 NOTE — HISTORY OF PRESENT ILLNESS
[FreeTextEntry1] : Mr. BARTHOLOMEW is a 52 year old male with no significant primary medical history who presents for penile anomaly.\par \par HPI:\par He works as a [employer] . He was provided with a  on request. He reports hx of penile silicone injection 17 years ago by an unlicensed personal. He reports onset penile swelling 3 months ago following intercourse. Associated penile pain started 20 days ago. He reports he went to a private urologist to consider removing the silicone injection because he is not satisfied with the shape of his penis which he thinks his "deformed". He adds that he is unable to have intercourse because of the pain. The urologist ordered am MRI and he offers to show me a picture on his phone. I request that the patient obtain the CD of the MRI with the films and he was agreeable. On 03/14/2023 he went to [facility] for gross hematuria. He subsequently obtained a CT scan on 03/14/2023 and the report shows " penile edema consistent with infection." He denies known allergies to antibiotics. \par \par He underwent cystoscopy with Dr Regalado which was unremarkable except for BPH.\par \par Interval hx:\par Now s/p penile reconstruction with skin flap 4/19/23\par He returns today for postop follow up. \par He still noticed drainage from incision, but no more bleeding. \par No fever/chills\par He has no voiding issue, no hematuria, dysuria or difficulty voiding.\par Overall he is feeling better and no pain. \par \par 7/11/2023\par He returns for follow up. He reports wound/incisions healing well overall, no pain, no drainage, but still swelling persistently in certain areas - particularly under head of penis.  He reports having erections that are good/firm.  Has not attempted sexual activity. \par No complaints of urination. No hematuria, dysuria or fever/chills. \par

## 2023-09-14 NOTE — H&P ADULT - NSICDXNOPASTSURGICALHX_GEN_ALL_CORE
<-- Click to add NO significant Past Surgical History Rinvoq Counseling: I discussed with the patient the risks of Rinvoq therapy including but not limited to upper respiratory tract infections, shingles, cold sores, bronchitis, nausea, cough, fever, acne, and headache. Live vaccines should be avoided.  This medication has been linked to serious infections; higher rate of mortality; malignancy and lymphoproliferative disorders; major adverse cardiovascular events; thrombosis; thrombocytopenia, anemia, and neutropenia; lipid elevations; liver enzyme elevations; and gastrointestinal perforations.

## 2023-10-03 ENCOUNTER — APPOINTMENT (OUTPATIENT)
Dept: UROLOGY | Facility: CLINIC | Age: 53
End: 2023-10-03
Payer: MEDICAID

## 2023-10-03 VITALS
DIASTOLIC BLOOD PRESSURE: 72 MMHG | HEIGHT: 67 IN | BODY MASS INDEX: 23.54 KG/M2 | SYSTOLIC BLOOD PRESSURE: 116 MMHG | WEIGHT: 150 LBS | HEART RATE: 58 BPM

## 2023-10-03 DIAGNOSIS — Q55.69 OTHER CONGENITAL MALFORMATION OF PENIS: ICD-10-CM

## 2023-10-03 DIAGNOSIS — N48.89 OTHER SPECIFIED DISORDERS OF PENIS: ICD-10-CM

## 2023-10-03 PROCEDURE — 99213 OFFICE O/P EST LOW 20 MIN: CPT

## 2024-01-23 ENCOUNTER — APPOINTMENT (OUTPATIENT)
Dept: UROLOGY | Facility: CLINIC | Age: 54
End: 2024-01-23

## 2024-04-30 NOTE — ED PROVIDER NOTE - NS ED ATTENDING STATEMENT MOD
Quality 226: Preventive Care And Screening: Tobacco Use: Screening And Cessation Intervention: Patient screened for tobacco use and is an ex/non-smoker Detail Level: Detailed Quality 431: Preventive Care And Screening: Unhealthy Alcohol Use - Screening: Patient not identified as an unhealthy alcohol user when screened for unhealthy alcohol use using a systematic screening method Quality 358: Patient-Centered Surgical Risk Assessment And Communication: Documentation of patient-specific risk assessment with a risk calculator based on multi-institutional clinical data, the specific risk calculator used, and communication of risk assessment from risk calculator with the patient or family. I have personally seen and examined this patient.  I have fully participated in the care of this patient. I have reviewed all pertinent clinical information, including history, physical exam, plan and the Resident’s note and agree except as noted.

## 2024-08-22 NOTE — PRE-ANESTHESIA EVALUATION ADULT - NSANTHINDVINFOSD_GEN_ALL_CORE
[Telehealth (audio & video) - Individual/Group] : This visit was provided via telehealth using real-time 2-way audio visual technology. [Other Location: e.g. Home (Enter Location, City,State)___] : The provider was located at [unfilled]. [Home] : The patient, [unfilled], was located at home, [unfilled], at the time of the visit. [Verbal consent obtained from patient/other participant(s)] : Verbal consent for telehealth/telephonic services obtained from patient/other participant(s) patient

## 2025-01-20 NOTE — CONSULT NOTE ADULT - PROVIDER SPECIALTY LIST ADULT
Continue with amlodipine. Do not start valsartan at this time. Further recommendations at office visit. No BMP necessary as not starting med.    Urology

## (undated) DEVICE — POSITIONER STRAP ARMBOARD VELCRO TS-30

## (undated) DEVICE — SUT POLYSORB 4-0 30" CVF-23 UNDYED

## (undated) DEVICE — ACMI SELF-SEALING SEAL UP TO 7FR

## (undated) DEVICE — LONE STAR RETRACTOR RING 32.5CM X 18.3CM DISP

## (undated) DEVICE — NDL COUNTER FOAM AND MAGNET 20-40

## (undated) DEVICE — SUCTION YANKAUER TAPERED BULBOUS NO VENT

## (undated) DEVICE — DRSG CURITY GAUZE SPONGE 4 X 4" 12-PLY

## (undated) DEVICE — SUT CHROMIC 4-0 30" C-13

## (undated) DEVICE — CANISTER DISPOSABLE THIN WALL 3000CC

## (undated) DEVICE — DRAIN PENROSE .5" X 18" LATEX

## (undated) DEVICE — FOLEY CATH 2-WAY 16FR 5CC LATEX COUNCIL RED

## (undated) DEVICE — VAGINAL PACKING 2"

## (undated) DEVICE — Device

## (undated) DEVICE — FRAZIER SUCTION TIP 8FR

## (undated) DEVICE — DRAPE 3/4 SHEET W REINFORCEMENT 56X77"

## (undated) DEVICE — DRAPE TOWEL BLUE 17" X 24"

## (undated) DEVICE — ELCTR BOVIE PENCIL BLADE 10FT

## (undated) DEVICE — PREP CHLORAPREP HI-LITE ORANGE 26ML

## (undated) DEVICE — SOL IRR POUR NS 0.9% 500ML

## (undated) DEVICE — TUBING IRR SET FOR CYSTOSCOPY 77"

## (undated) DEVICE — LABELS BLANK W PEN

## (undated) DEVICE — DRSG BENZOIN 0.6CC

## (undated) DEVICE — SUT CAPIO SLIM CAPTURING DEVICE

## (undated) DEVICE — BLADE SURGICAL #15 CARBON

## (undated) DEVICE — SOL IRR POUR H2O 500ML

## (undated) DEVICE — STAPLER SKIN VISI-STAT 35 WIDE

## (undated) DEVICE — FOLEY CATH 2-WAY 18FR 5CC LATEX COUNCIL RED

## (undated) DEVICE — PROTECTOR HEEL CONVOLUTED

## (undated) DEVICE — SUT MONOCRYL 3-0 18" PS-2 UNDYED

## (undated) DEVICE — MARKING PEN W RULER

## (undated) DEVICE — LONE STAR ELASTIC STAY HOOK 12MM BLUNT

## (undated) DEVICE — SPONGE DISSECTOR PEANUT

## (undated) DEVICE — VENODYNE/SCD SLEEVE CALF MEDIUM

## (undated) DEVICE — SUT SOFSILK 2-0 18" C-23

## (undated) DEVICE — PREP BETADINE KIT

## (undated) DEVICE — SUT PDS II 5-0 27" RB-1

## (undated) DEVICE — DRAPE MAYO STAND 23"

## (undated) DEVICE — ELCTR GROUNDING PAD ADULT COVIDIEN

## (undated) DEVICE — POSITIONER PATIENT SAFETY STRAP 3X60"

## (undated) DEVICE — SUT PLAIN GUT 2-0 30" V-20

## (undated) DEVICE — GLV 7.5 PROTEXIS (WHITE)

## (undated) DEVICE — LIGASURE SMALL JAW

## (undated) DEVICE — PACK GENERAL MINOR

## (undated) DEVICE — DRAINAGE BAG URINARY 2L

## (undated) DEVICE — DRSG TEGADERM 3.5X6"

## (undated) DEVICE — PREP BETADINE SPONGE STICKS

## (undated) DEVICE — SUT CAPIO 0 48" TC DA

## (undated) DEVICE — NDL HYPO REGULAR BEVEL 25G X 1.5" (BLUE)

## (undated) DEVICE — TUBING SUCTION NONCONDUCTIVE 6MM X 12FT

## (undated) DEVICE — DRSG DERMABOND 0.7ML

## (undated) DEVICE — DRSG COBAN 2" LF STERILE

## (undated) DEVICE — FOLEY TRAY 16FR LF URINE METER SURESTEP

## (undated) DEVICE — DRAIN JACKSON PRATT 3 SPRING RESERVOIR W 10FR PVC DRAIN

## (undated) DEVICE — SUT CHROMIC 2-0 30" V-20

## (undated) DEVICE — TUBING SUCTION 20FT

## (undated) DEVICE — DRAPE SPLIT SHEET 77" X 108"

## (undated) DEVICE — SUT CHROMIC 3-0 30" V-20

## (undated) DEVICE — SUT POLYSORB 3-0 30" V-20 UNDYED

## (undated) DEVICE — WARMING BLANKET FULL ADULT

## (undated) DEVICE — LONE STAR ELASTIC STAY HOOK 20MM 3 FINGER RAKE

## (undated) DEVICE — ELCTR BOVIE TIP NEEDLE INSULATED 2.8" EDGE

## (undated) DEVICE — DRSG TAPE TRANSPORE 1"

## (undated) DEVICE — SUT VICRYL 5-0 27" RB-1 UNDYED